# Patient Record
Sex: MALE | Race: WHITE | Employment: OTHER | URBAN - METROPOLITAN AREA
[De-identification: names, ages, dates, MRNs, and addresses within clinical notes are randomized per-mention and may not be internally consistent; named-entity substitution may affect disease eponyms.]

---

## 2019-04-03 ENCOUNTER — OFFICE VISIT (OUTPATIENT)
Dept: GASTROENTEROLOGY | Age: 84
End: 2019-04-03
Payer: MEDICARE

## 2019-04-03 VITALS
SYSTOLIC BLOOD PRESSURE: 122 MMHG | HEART RATE: 56 BPM | TEMPERATURE: 97.8 F | HEIGHT: 73 IN | WEIGHT: 179 LBS | DIASTOLIC BLOOD PRESSURE: 68 MMHG | BODY MASS INDEX: 23.72 KG/M2 | OXYGEN SATURATION: 96 %

## 2019-04-03 DIAGNOSIS — R19.4 CHANGE IN BOWEL HABITS: Primary | ICD-10-CM

## 2019-04-03 DIAGNOSIS — K59.09 OTHER CONSTIPATION: ICD-10-CM

## 2019-04-03 PROCEDURE — 99203 OFFICE O/P NEW LOW 30 MIN: CPT | Performed by: INTERNAL MEDICINE

## 2019-04-03 RX ORDER — POLYETHYLENE GLYCOL 3350 17 G/17G
17 POWDER, FOR SOLUTION ORAL DAILY
Status: ON HOLD | COMMUNITY
End: 2020-03-25

## 2019-04-03 RX ORDER — COVID-19 ANTIGEN TEST
1 KIT MISCELLANEOUS PRN
Status: ON HOLD | COMMUNITY
End: 2020-09-08

## 2019-04-03 NOTE — PROGRESS NOTES
Gastroenterology Clinic Visit    Konstantin Chapman  [id number]  Chief Complaint   Patient presents with    Consultation     HPI: 81 y.o. male presents to the clinic for a second opinion for GI symptoms. He reports having change in his bowel habits since his colonoscopy in January 2019. Patient underwent a colonoscopy for family history of colon cancer. Prior to this patient underwent a colonoscopy in 2016 at [facility]. At colonoscopy the prep was noted to be suboptimal/inadequate, a polyp was removed, sigmoid diverticulosis and significant redundancy of the colon was noted. Patient reports that since the procedure he is having to take 2 capfuls of MiraLAX, Dulcolax and fiber to facilitate a bowel movement. Prior to this patient reports having normal bowel movements without any supplements. Patient denies any change in weight, denies any blood with the stool, denies any abdominal pain    Review of Systems   All other systems reviewed and are negative. History reviewed. No pertinent past medical history. Past Surgical History:   Procedure Laterality Date    COLONOSCOPY  01/23/2019    [facility]     Current Outpatient Medications on File Prior to Visit   Medication Sig Dispense Refill    Naproxen Sodium (ALEVE) 220 MG CAPS Take 1 capsule by mouth as needed for Pain      polyethylene glycol (GLYCOLAX) powder Take 17 g by mouth daily       No current facility-administered medications on file prior to visit.       Family History   Problem Relation Age of Onset    Colon Cancer Mother       Social History     Socioeconomic History    Marital status:      Spouse name: None    Number of children: None    Years of education: None    Highest education level: None   Occupational History    None   Social Needs    Financial resource strain: None    Food insecurity:     Worry: None     Inability: None    Transportation needs:     Medical: None     Non-medical: None   Tobacco Use    Smoking 2019: Inadequate preparation, diverticulosis of the sigmoid colon, redundant colon, 7 mm polyp in the transverse colon    Assessmentand Plan:  81 y.o. male with change in bowel habits since colonoscopy in January 2019 at [facility]. Patient with no alarm symptoms. It appears patient has had a change in the colonic micro-biome/Christa. - Patient reassured  - Probiotics  - Patient advised to continue MiraLAX, fiber and Colace, taper down as needed. There are no diagnoses linked to this encounter. No follow-ups on file. Kat Ozuna MD   Staff Gastroenterologist  [facility]    Please note this report has been partially produced using speech recognition software and may cause contain errors related to thatsystem including grammar, punctuation and spelling as well as words and phrases that may seem inappropriate. If there are questions or concerns please feel free to contact me to clarify.

## 2019-12-20 ENCOUNTER — APPOINTMENT (OUTPATIENT)
Dept: ULTRASOUND IMAGING | Age: 84
End: 2019-12-20
Payer: MEDICARE

## 2019-12-20 ENCOUNTER — HOSPITAL ENCOUNTER (EMERGENCY)
Age: 84
Discharge: HOME | End: 2019-12-20
Payer: MEDICARE

## 2019-12-20 ENCOUNTER — APPOINTMENT (OUTPATIENT)
Dept: GENERAL RADIOLOGY | Age: 84
End: 2019-12-20
Payer: MEDICARE

## 2019-12-20 VITALS
HEART RATE: 66 BPM | BODY MASS INDEX: 23.59 KG/M2 | DIASTOLIC BLOOD PRESSURE: 62 MMHG | OXYGEN SATURATION: 96 % | TEMPERATURE: 97.8 F | HEIGHT: 73 IN | RESPIRATION RATE: 17 BRPM | SYSTOLIC BLOOD PRESSURE: 181 MMHG | WEIGHT: 178 LBS

## 2019-12-20 DIAGNOSIS — S86.911A STRAIN OF RIGHT KNEE, INITIAL ENCOUNTER: ICD-10-CM

## 2019-12-20 DIAGNOSIS — S83.421A SPRAIN OF LATERAL COLLATERAL LIGAMENT OF RIGHT KNEE, INITIAL ENCOUNTER: Primary | ICD-10-CM

## 2019-12-20 LAB
ANION GAP SERPL CALCULATED.3IONS-SCNC: 11 MEQ/L (ref 9–15)
BASOPHILS ABSOLUTE: 0 K/UL (ref 0–0.2)
BASOPHILS RELATIVE PERCENT: 0.4 %
BUN BLDV-MCNC: 24 MG/DL (ref 8–23)
CALCIUM SERPL-MCNC: 9 MG/DL (ref 8.5–9.9)
CHLORIDE BLD-SCNC: 106 MEQ/L (ref 95–107)
CO2: 23 MEQ/L (ref 20–31)
CREAT SERPL-MCNC: 0.74 MG/DL (ref 0.7–1.2)
EOSINOPHILS ABSOLUTE: 0.1 K/UL (ref 0–0.7)
EOSINOPHILS RELATIVE PERCENT: 1.6 %
GFR AFRICAN AMERICAN: >60
GFR NON-AFRICAN AMERICAN: >60
GLUCOSE BLD-MCNC: 94 MG/DL (ref 70–99)
HCT VFR BLD CALC: 41.8 % (ref 42–52)
HEMOGLOBIN: 14.3 G/DL (ref 14–18)
INR BLD: 1
LYMPHOCYTES ABSOLUTE: 1.3 K/UL (ref 1–4.8)
LYMPHOCYTES RELATIVE PERCENT: 19.6 %
MCH RBC QN AUTO: 34 PG (ref 27–31.3)
MCHC RBC AUTO-ENTMCNC: 34.2 % (ref 33–37)
MCV RBC AUTO: 99.2 FL (ref 80–100)
MONOCYTES ABSOLUTE: 0.7 K/UL (ref 0.2–0.8)
MONOCYTES RELATIVE PERCENT: 9.8 %
NEUTROPHILS ABSOLUTE: 4.6 K/UL (ref 1.4–6.5)
NEUTROPHILS RELATIVE PERCENT: 68.6 %
PDW BLD-RTO: 13.3 % (ref 11.5–14.5)
PLATELET # BLD: 164 K/UL (ref 130–400)
POTASSIUM SERPL-SCNC: 4.2 MEQ/L (ref 3.4–4.9)
PROTHROMBIN TIME: 13.3 SEC (ref 12.3–14.9)
RBC # BLD: 4.21 M/UL (ref 4.7–6.1)
SODIUM BLD-SCNC: 140 MEQ/L (ref 135–144)
WBC # BLD: 6.7 K/UL (ref 4.8–10.8)

## 2019-12-20 PROCEDURE — 80048 BASIC METABOLIC PNL TOTAL CA: CPT

## 2019-12-20 PROCEDURE — 73562 X-RAY EXAM OF KNEE 3: CPT

## 2019-12-20 PROCEDURE — 93971 EXTREMITY STUDY: CPT

## 2019-12-20 PROCEDURE — 36415 COLL VENOUS BLD VENIPUNCTURE: CPT

## 2019-12-20 PROCEDURE — 85610 PROTHROMBIN TIME: CPT

## 2019-12-20 PROCEDURE — 85025 COMPLETE CBC W/AUTO DIFF WBC: CPT

## 2019-12-20 PROCEDURE — 99284 EMERGENCY DEPT VISIT MOD MDM: CPT

## 2019-12-20 ASSESSMENT — PAIN DESCRIPTION - PAIN TYPE: TYPE: ACUTE PAIN

## 2019-12-20 ASSESSMENT — PAIN DESCRIPTION - ORIENTATION: ORIENTATION: RIGHT

## 2019-12-20 ASSESSMENT — PAIN DESCRIPTION - FREQUENCY: FREQUENCY: INTERMITTENT

## 2019-12-20 ASSESSMENT — PAIN SCALES - GENERAL: PAINLEVEL_OUTOF10: 3

## 2019-12-20 ASSESSMENT — PAIN DESCRIPTION - LOCATION: LOCATION: KNEE

## 2019-12-21 ASSESSMENT — ENCOUNTER SYMPTOMS
SHORTNESS OF BREATH: 0
NAUSEA: 0
WHEEZING: 0
COLOR CHANGE: 0
ABDOMINAL PAIN: 0
TROUBLE SWALLOWING: 0
CONSTIPATION: 0
BACK PAIN: 0
VOMITING: 0
ABDOMINAL DISTENTION: 0
DIARRHEA: 0
RHINORRHEA: 0
SORE THROAT: 0
CHEST TIGHTNESS: 0
COUGH: 0

## 2020-01-21 ENCOUNTER — HOSPITAL ENCOUNTER (EMERGENCY)
Age: 85
Discharge: ANOTHER HOSPITAL | End: 2020-01-21
Attending: STUDENT IN AN ORGANIZED HEALTH CARE EDUCATION/TRAINING PROGRAM
Payer: MEDICARE

## 2020-01-21 ENCOUNTER — APPOINTMENT (OUTPATIENT)
Dept: GENERAL RADIOLOGY | Age: 85
End: 2020-01-21
Payer: MEDICARE

## 2020-01-21 VITALS
BODY MASS INDEX: 23.06 KG/M2 | WEIGHT: 174 LBS | HEART RATE: 80 BPM | OXYGEN SATURATION: 98 % | HEIGHT: 73 IN | RESPIRATION RATE: 18 BRPM | TEMPERATURE: 98.1 F | SYSTOLIC BLOOD PRESSURE: 180 MMHG | DIASTOLIC BLOOD PRESSURE: 70 MMHG

## 2020-01-21 PROBLEM — R91.1 SOLITARY PULMONARY NODULE: Status: ACTIVE | Noted: 2019-02-06

## 2020-01-21 PROBLEM — E78.5 HLD (HYPERLIPIDEMIA): Status: ACTIVE | Noted: 2020-01-21

## 2020-01-21 PROBLEM — D31.32 CHOROIDAL NEVUS OF LEFT EYE: Status: ACTIVE | Noted: 2018-12-13

## 2020-01-21 PROBLEM — C67.9 PRIMARY MALIGNANT NEOPLASM OF BLADDER (HCC): Status: ACTIVE | Noted: 2018-03-14

## 2020-01-21 PROBLEM — C64.9 MALIGNANT NEOPLASM OF UNSPECIFIED KIDNEY, EXCEPT RENAL PELVIS (HCC): Status: ACTIVE | Noted: 2019-02-06

## 2020-01-21 PROBLEM — R31.0 FRANK HEMATURIA: Status: ACTIVE | Noted: 2020-01-21

## 2020-01-21 PROBLEM — H35.3132 NONEXUDATIVE AGE-RELATED MACULAR DEGENERATION, BILATERAL, INTERMEDIATE DRY STAGE: Status: ACTIVE | Noted: 2017-08-16

## 2020-01-21 PROBLEM — H43.813 POSTERIOR VITREOUS DETACHMENT OF BOTH EYES: Status: ACTIVE | Noted: 2018-12-13

## 2020-01-21 LAB
ABO/RH: NORMAL
ANION GAP SERPL CALCULATED.3IONS-SCNC: 12 MEQ/L (ref 9–15)
ANTIBODY SCREEN: NORMAL
APTT: 31.3 SEC (ref 24.4–36.8)
BASOPHILS ABSOLUTE: 0 K/UL (ref 0–0.2)
BASOPHILS RELATIVE PERCENT: 0.2 %
BUN BLDV-MCNC: 17 MG/DL (ref 8–23)
CALCIUM SERPL-MCNC: 9.5 MG/DL (ref 8.5–9.9)
CHLORIDE BLD-SCNC: 104 MEQ/L (ref 95–107)
CO2: 24 MEQ/L (ref 20–31)
CREAT SERPL-MCNC: 0.76 MG/DL (ref 0.7–1.2)
EOSINOPHILS ABSOLUTE: 0 K/UL (ref 0–0.7)
EOSINOPHILS RELATIVE PERCENT: 0.5 %
GFR AFRICAN AMERICAN: >60
GFR NON-AFRICAN AMERICAN: >60
GLUCOSE BLD-MCNC: 108 MG/DL (ref 70–99)
HCT VFR BLD CALC: 43 % (ref 42–52)
HEMOGLOBIN: 14.7 G/DL (ref 14–18)
INR BLD: 1
LYMPHOCYTES ABSOLUTE: 1 K/UL (ref 1–4.8)
LYMPHOCYTES RELATIVE PERCENT: 13.5 %
MCH RBC QN AUTO: 34.2 PG (ref 27–31.3)
MCHC RBC AUTO-ENTMCNC: 34.1 % (ref 33–37)
MCV RBC AUTO: 100.1 FL (ref 80–100)
MONOCYTES ABSOLUTE: 0.6 K/UL (ref 0.2–0.8)
MONOCYTES RELATIVE PERCENT: 8.2 %
NEUTROPHILS ABSOLUTE: 5.9 K/UL (ref 1.4–6.5)
NEUTROPHILS RELATIVE PERCENT: 77.6 %
PDW BLD-RTO: 13 % (ref 11.5–14.5)
PLATELET # BLD: 157 K/UL (ref 130–400)
POTASSIUM SERPL-SCNC: 4.9 MEQ/L (ref 3.4–4.9)
PROTHROMBIN TIME: 13.8 SEC (ref 12.3–14.9)
RBC # BLD: 4.3 M/UL (ref 4.7–6.1)
SODIUM BLD-SCNC: 140 MEQ/L (ref 135–144)
WBC # BLD: 7.6 K/UL (ref 4.8–10.8)

## 2020-01-21 PROCEDURE — 6360000002 HC RX W HCPCS: Performed by: NURSE PRACTITIONER

## 2020-01-21 PROCEDURE — 85610 PROTHROMBIN TIME: CPT

## 2020-01-21 PROCEDURE — 73130 X-RAY EXAM OF HAND: CPT

## 2020-01-21 PROCEDURE — 86850 RBC ANTIBODY SCREEN: CPT

## 2020-01-21 PROCEDURE — 36415 COLL VENOUS BLD VENIPUNCTURE: CPT

## 2020-01-21 PROCEDURE — 96375 TX/PRO/DX INJ NEW DRUG ADDON: CPT

## 2020-01-21 PROCEDURE — 80048 BASIC METABOLIC PNL TOTAL CA: CPT

## 2020-01-21 PROCEDURE — 99284 EMERGENCY DEPT VISIT MOD MDM: CPT

## 2020-01-21 PROCEDURE — 2580000003 HC RX 258: Performed by: NURSE PRACTITIONER

## 2020-01-21 PROCEDURE — APPSS45 APP SPLIT SHARED TIME 31-45 MINUTES: Performed by: PHYSICIAN ASSISTANT

## 2020-01-21 PROCEDURE — 86900 BLOOD TYPING SEROLOGIC ABO: CPT

## 2020-01-21 PROCEDURE — 85025 COMPLETE CBC W/AUTO DIFF WBC: CPT

## 2020-01-21 PROCEDURE — 86901 BLOOD TYPING SEROLOGIC RH(D): CPT

## 2020-01-21 PROCEDURE — 71045 X-RAY EXAM CHEST 1 VIEW: CPT

## 2020-01-21 PROCEDURE — 85730 THROMBOPLASTIN TIME PARTIAL: CPT

## 2020-01-21 PROCEDURE — 96365 THER/PROPH/DIAG IV INF INIT: CPT

## 2020-01-21 PROCEDURE — 90471 IMMUNIZATION ADMIN: CPT | Performed by: NURSE PRACTITIONER

## 2020-01-21 PROCEDURE — 90715 TDAP VACCINE 7 YRS/> IM: CPT | Performed by: NURSE PRACTITIONER

## 2020-01-21 PROCEDURE — 6830039000 HC L3 TRAUMA ALERT

## 2020-01-21 RX ORDER — ONDANSETRON 2 MG/ML
4 INJECTION INTRAMUSCULAR; INTRAVENOUS EVERY 10 MIN PRN
Status: DISCONTINUED | OUTPATIENT
Start: 2020-01-21 | End: 2020-01-21 | Stop reason: HOSPADM

## 2020-01-21 RX ORDER — FENTANYL CITRATE 50 UG/ML
50 INJECTION, SOLUTION INTRAMUSCULAR; INTRAVENOUS EVERY 30 MIN PRN
Status: DISCONTINUED | OUTPATIENT
Start: 2020-01-21 | End: 2020-01-21 | Stop reason: HOSPADM

## 2020-01-21 RX ADMIN — FENTANYL CITRATE 50 MCG: 50 INJECTION, SOLUTION INTRAMUSCULAR; INTRAVENOUS at 13:18

## 2020-01-21 RX ADMIN — TETANUS TOXOID, REDUCED DIPHTHERIA TOXOID AND ACELLULAR PERTUSSIS VACCINE, ADSORBED 0.5 ML: 5; 2.5; 8; 8; 2.5 SUSPENSION INTRAMUSCULAR at 13:19

## 2020-01-21 RX ADMIN — ONDANSETRON 4 MG: 2 INJECTION INTRAMUSCULAR; INTRAVENOUS at 13:18

## 2020-01-21 RX ADMIN — CEFAZOLIN 1 G: 1 INJECTION, POWDER, FOR SOLUTION INTRAMUSCULAR; INTRAVENOUS at 13:18

## 2020-01-21 ASSESSMENT — ENCOUNTER SYMPTOMS
NAUSEA: 0
ABDOMINAL PAIN: 0
DIARRHEA: 0
RHINORRHEA: 0
COUGH: 0
PHOTOPHOBIA: 0
VOMITING: 0
SHORTNESS OF BREATH: 0
SORE THROAT: 0
BACK PAIN: 0
EYE PAIN: 0

## 2020-01-21 ASSESSMENT — PAIN SCALES - GENERAL
PAINLEVEL_OUTOF10: 2
PAINLEVEL_OUTOF10: 6

## 2020-01-21 NOTE — ED NOTES
New dressing applied. Patient has partial cut wedding ring on. Unable to remove.       Olivier Schofield RN  01/21/20 [id number]

## 2020-01-21 NOTE — H&P
Number of children: None    Years of education: None    Highest education level: None   Occupational History    None   Social Needs    Financial resource strain: None    Food insecurity:     Worry: None     Inability: None    Transportation needs:     Medical: None     Non-medical: None   Tobacco Use    Smoking status: Never Smoker    Smokeless tobacco: Never Used   Substance and Sexual Activity    Alcohol use: Not Currently    Drug use: Never    Sexual activity: None   Lifestyle    Physical activity:     Days per week: None     Minutes per session: None    Stress: None   Relationships    Social connections:     Talks on phone: None     Gets together: None     Attends Yarsanism service: None     Active member of club or organization: None     Attends meetings of clubs or organizations: None     Relationship status: None    Intimate partner violence:     Fear of current or ex partner: None     Emotionally abused: None     Physically abused: None     Forced sexual activity: None   Other Topics Concern    None   Social History Narrative    None       FAMILY HISTORY:  Family History   Problem Relation Age of Onset    Colon Cancer Mother            REVIEW OF SYSTEMS:  Constitutional: Negative for  weight loss  HENT: Negative for congestion, facial swelling and bloody nose  Eyes: Negative for  vision changes  Respiratory: Negative for shortness of breath, difficulty breathing  Cardiovascular: Negative for chest wall pain. Gastrointestinal: Negative for abdominal distention, abdominal pain and vomiting. Genitourinary: Negative for  hematuria  Musculoskeletal: Negative for gait difficulties   Skin: Lacerations of L hand, with amputation of L 2nd digit. Negative for bruising, abrasions  Neurological: Negative for dizziness, weakness and light-headedness. Hematological: Negative for easy bruising/bleeding  Psychiatric/Behavioral: Negative for behavioral problems.        Except as noted above the remainder of the review of systems was reviewed and negative. BASIC LABS:  CBC with Differential:    Lab Results   Component Value Date    WBC 7.6 01/21/2020    RBC 4.30 01/21/2020    HGB 14.7 01/21/2020    HCT 43.0 01/21/2020     01/21/2020    .1 01/21/2020    MCH 34.2 01/21/2020    MCHC 34.1 01/21/2020    RDW 13.0 01/21/2020    LYMPHOPCT 13.5 01/21/2020    MONOPCT 8.2 01/21/2020    BASOPCT 0.2 01/21/2020    MONOSABS 0.6 01/21/2020    LYMPHSABS 1.0 01/21/2020    EOSABS 0.0 01/21/2020    BASOSABS 0.0 01/21/2020     CMP:   Lab Results   Component Value Date     01/21/2020    K 4.9 01/21/2020     01/21/2020    CO2 24 01/21/2020    BUN 17 01/21/2020    CREATININE 0.76 01/21/2020    GLUCOSE 108 (H) 01/21/2020    CALCIUM 9.5 01/21/2020    LABGLOM >60.0 01/21/2020    GFRAA >60.0 01/21/2020     Magnesium: No results found for: MG  Troponin: No results found for: TROPONINI  PT/INR:   Recent Labs     01/21/20  1331   PROTIME 13.8   INR 1.0     APTT:   Recent Labs     01/21/20  1331   APTT 31.3     EtOH: No results found for: ETOH    RADIOLOGY:  XR Left Hand: Results pending    ASSESSMENT:  Chantelle Lowe is a 81 y.o. male with PMHx of prostate and bladder CA. He presents to [facility] [place] [facility] [place] [facility] via EMS s/p radial arm saw injury. (+)amputation of L 2nd phalanx. Trauma workup indicates a complete amputation of L 2nd phalanx just proximal of the PIP. There is also evidence of two deep lacerations with partial amputation of L 3rd phalanx, and single laceration w/ partial amputation of L 4th phalanx. PLAN:  Due to the severity of the injuries to the hand, with need for possible reconstruction/reimplanation of the finger, [facility] was contacted for transfer via [facility]. Pt was given prophylactic abx at this facility, with his Tetanus status was updated. He remained hemodynamically stable. Pain well controlled.        Patient discussed with attending trauma surgeon, Dr. Aga Velazquez,

## 2020-01-21 NOTE — ED PROVIDER NOTES
[address] [facility] ED  EMERGENCY DEPARTMENT ENCOUNTER      Pt Name: Brendon Xiong  MRN: [id number]  [place] 1935  Date of evaluation: 1/21/2020  Provider: SAMINA Henning - JAIME    CHIEF COMPLAINT       Chief Complaint   Patient presents with    Hand Injury     left hand on saw, 3 amputations          HISTORY OF PRESENT ILLNESS   (Location/Symptom, Timing/Onset,Context/Setting, Quality, Duration, Modifying Factors, Severity)  Note limiting factors. Brendon Xiong is a 81 y.o. male who presents to the emergency department with complaints of losing a finger and having 2 more fingers \"barely hanging on\" after losing control of saw. No thinners but on 81 asa daily and occasionally 500 naprosyn. Loss of sensation distal to laceration on 3rd, 4th fingers. Location/Symptom - amputation  Onset - pta  Context/Setting - as above  Quality - traumatic amputation  Duration - immediately pta  Modifying Factors - none  Severity - severe        Nursing Notes were reviewed. REVIEW OF SYSTEMS    (2-9 systems for level 4, 10 or more for level 5)     Review of Systems   Constitutional: Negative for chills, diaphoresis, fatigue and fever. HENT: Negative for congestion, rhinorrhea and sore throat. Eyes: Negative for photophobia and pain. Respiratory: Negative for cough and shortness of breath. Cardiovascular: Negative for chest pain and palpitations. Gastrointestinal: Negative for abdominal pain, diarrhea, nausea and vomiting. Genitourinary: Negative for dysuria and flank pain. Musculoskeletal: Negative for back pain. Skin: Positive for wound. Negative for rash. Neurological: Negative for dizziness, light-headedness and headaches. Psychiatric/Behavioral: Negative. All other systems reviewed and are negative. Except as noted above the remainder of the review of systems was reviewed and negative.        PAST MEDICAL HISTORY     Past Medical History:   Diagnosis Date    Cancer [facility]) prostate and bladder      Past Surgical History:   Procedure Laterality Date    COLONOSCOPY  01/23/2019    [facility]    HERNIA REPAIR      x2      Social History     Socioeconomic History    Marital status:      Spouse name: None    Number of children: None    Years of education: None    Highest education level: None   Occupational History    None   Social Needs    Financial resource strain: None    Food insecurity:     Worry: None     Inability: None    Transportation needs:     Medical: None     Non-medical: None   Tobacco Use    Smoking status: Never Smoker    Smokeless tobacco: Never Used   Substance and Sexual Activity    Alcohol use: Not Currently    Drug use: Never    Sexual activity: None   Lifestyle    Physical activity:     Days per week: None     Minutes per session: None    Stress: None   Relationships    Social connections:     Talks on phone: None     Gets together: None     Attends Taoism service: None     Active member of club or organization: None     Attends meetings of clubs or organizations: None     Relationship status: None    Intimate partner violence:     Fear of current or ex partner: None     Emotionally abused: None     Physically abused: None     Forced sexual activity: None   Other Topics Concern    None   Social History Narrative    None       SCREENINGS    [place] Coma Scale  Eye Opening: Spontaneous  Best Verbal Response: Oriented  Best Motor Response: Obeys commands  [place] Coma Scale Score: 15        PHYSICAL EXAM    (up to 7 for level 4, 8 or more for level 5)     ED Triage Vitals   BP Temp Temp src Pulse Resp SpO2 Height Weight   01/21/20 1303 -- -- 01/21/20 1303 01/21/20 1303 01/21/20 1303 01/21/20 1304 01/21/20 1304   (!) 180/70   80 18 98 % 6' 1\" (1.854 m) 174 lb (78.9 kg)       Physical Exam  Vitals signs and nursing note reviewed. Constitutional:       General: He is not in acute distress. Appearance: Normal appearance. He is well-developed. He is not diaphoretic. HENT:      Head: Normocephalic and atraumatic. Eyes:      General: Lids are normal.      Conjunctiva/sclera: Conjunctivae normal.   Neck:      Musculoskeletal: Normal range of motion and neck supple. Cardiovascular:      Rate and Rhythm: Normal rate and regular rhythm. Pulses: Normal pulses. Heart sounds: Normal heart sounds. Pulmonary:      Effort: Pulmonary effort is normal.      Breath sounds: Normal breath sounds. Abdominal:      General: Bowel sounds are normal.      Palpations: Abdomen is soft. Tenderness: There is no tenderness. Musculoskeletal:      Left hand: He exhibits decreased range of motion, tenderness, bony tenderness and laceration. Decreased sensation noted. Comments: Amputation of 2nd phalanx. Partial amputation of 3rd phalanx with dusky appearance of digit distal to lac. Minimal \"not sure\" sensation distal from lac to 3rd digit. Partial amputation of 4th phalanx. Decreased sensation distal to lac. Lymphadenopathy:      Cervical: No cervical adenopathy. Skin:     General: Skin is warm and dry. Capillary Refill: Capillary refill takes less than 2 seconds. Findings: No rash. Neurological:      Mental Status: He is alert and oriented to person, place, and time. Psychiatric:         Thought Content: Thought content normal.         Judgment: Judgment normal.         RESULTS     EKG: All EKG's are interpreted by the Emergency Department Physician who either signs or Co-signsthis chart in the absence of a cardiologist.        RADIOLOGY:   Non-plain filmimages such as CT, Ultrasound and MRI are read by the radiologist. Plain radiographic images are visualized and preliminarily interpreted by the emergency physician with the below findings:    Reviewed.       Interpretation per the Radiologist below, if available at the time ofthis note:    XR HAND RIGHT (MIN 3 VIEWS)    (Results Pending)   XR CHEST PORTABLE    (Results Pending) XR HAND LEFT (MIN 3 VIEWS)    (Results Pending)         ED BEDSIDE ULTRASOUND:   Performed by ED Physician - none    LABS:  Labs Reviewed   CBC WITH AUTO DIFFERENTIAL - Abnormal; Notable for the following components:       Result Value    RBC 4.30 (*)     .1 (*)     MCH 34.2 (*)     All other components within normal limits   BASIC METABOLIC PANEL   APTT   PROTIME-INR   TYPE AND SCREEN       All other labs were within normal range or not returned as of this dictation. EMERGENCY DEPARTMENT COURSE and DIFFERENTIAL DIAGNOSIS/MDM:   Vitals:    Vitals:    01/21/20 1303 01/21/20 1304 01/21/20 1320   BP: (!) 180/70     Pulse: 80     Resp: 18     Temp:   98.1 °F (36.7 °C)   TempSrc:   Oral   SpO2: 98%     Weight:  174 lb (78.9 kg)    Height:  6' 1\" (1.854 m)             MDM Evaluated on Arrival with Trauma team.  Pt has severed 2nd digit with him. Partial amputation of 3rd, 4th digit noted. Needs hand specialist, trauma wishes us to arrange transfer to [facility]. Dr. Kamila Huntley accepts. CRITICAL CARE TIME   Critical Care: The high probability of sudden, clinically significant deterioration in the patient's condition required the highest level of my preparedness to intervene urgently. The services I provided to this patient were to treat and/or prevent clinically significant deterioration. Services included the following: chart data review, reviewing nursing notes and/or old charts, documentation time, consultant collaboration regarding findings and treatment options, medication orders and management, direct patient care, vital sign assessments and ordering, interpreting and reviewing diagnostic studies/lab tests. Aggregate critical care time includes only time during which I was engaged in work directly related to the patient's care, as described above, whether at the bedside or elsewhere in the Emergency Department. It did not include time spent performing other reported procedures.      Critical Care: 47 minutes. CONSULTS:  None    PROCEDURES:  Unless otherwise noted below, none     Procedures    FINAL IMPRESSION      1. Traumatic amputation of finger, initial encounter    2. Partial traumatic amputation of finger through phalanx, initial encounter          DISPOSITION/PLAN   DISPOSITION Decision To Transfer 01/21/2020 01:28:29 PM      PATIENT REFERRED TO:  No follow-up provider specified.     DISCHARGE MEDICATIONS:  New Prescriptions    No medications on file          (Please notethat portions of this note were completed with a voice recognition program.  Efforts were made to edit the dictations but occasionally words are mis-transcribed.)    SAMINA Clay CNP (electronically signed)  Attending Emergency Physician          SAMINA Clay CNP  01/21/20 [address] , APRN - CNP  01/21/20 1750

## 2020-01-21 NOTE — ED NOTES
Patient has full amputation left index finger, partial middle finger amputation proximal to DIP, left ring partial amputation with bone exposure. Xray completed at bedside. Sensation to affected fingers except index finger.       Criselda Rivas RN  01/21/20 1102 [address] Marycarmen Mendoza RN  01/21/20 1318

## 2020-02-07 LAB
FOLATE: 15.5 NG/ML (ref 7.3–26.1)
HBA1C MFR BLD: 5.7 % (ref 4.8–5.9)
TSH SERPL DL<=0.05 MIU/L-ACNC: 3.17 UIU/ML (ref 0.44–3.86)
VITAMIN B-12: 312 PG/ML (ref 232–1245)
VITAMIN D 25-HYDROXY: 25.1 NG/ML (ref 30–100)

## 2020-02-11 LAB
ALBUMIN SERPL-MCNC: 4.29 G/DL (ref 3.75–5.01)
ALPHA-1-GLOBULIN: 0.32 G/DL (ref 0.19–0.46)
ALPHA-2-GLOBULIN: 0.82 G/DL (ref 0.48–1.05)
BETA GLOBULIN: 0.91 G/DL (ref 0.48–1.1)
GAMMA GLOBULIN: 0.95 G/DL (ref 0.62–1.51)
PROTEIN ELECTROPHORESIS, SERUM: NORMAL
SPE/IFE INTERPRETATION: NORMAL
TOTAL PROTEIN: 7.3 G/DL (ref 6.3–8.2)

## 2020-02-19 ENCOUNTER — HOSPITAL ENCOUNTER (OUTPATIENT)
Dept: MRI IMAGING | Age: 85
Discharge: HOME | End: 2020-02-21
Payer: MEDICARE

## 2020-02-19 ENCOUNTER — HOSPITAL ENCOUNTER (OUTPATIENT)
Dept: NEUROLOGY | Age: 85
Discharge: HOME | End: 2020-02-19
Payer: MEDICARE

## 2020-02-19 PROCEDURE — 95816 EEG AWAKE AND DROWSY: CPT

## 2020-02-19 PROCEDURE — 70551 MRI BRAIN STEM W/O DYE: CPT

## 2020-02-19 NOTE — PROCEDURES
[address]                      [address], [address] [facility]                          ELECTROENCEPHALOGRAM REPORT    PATIENT NAME: Elba Martin                       :        1935  MED REC NO:   [id number]                            ROOM:  ACCOUNT NO:   [de-identified]                           ADMIT DATE: 2020  PROVIDER:     Jagjit Colbert MD    DATE OF EE2020    REASON FOR STUDY:  The patient has a history of  seizures. EEG FINDINGS:  This is a 20-channel EEG. The patient has a background  of 9 to 10 Hz alpha activity, which is moderately well-developed and  moderately well-organized. Cardiac monitor shows heart rate of 51 beats  per minute and is regular. Artifacts appear due to patient's movements,  muscle activity and electrodes. Eye opening suppresses the background  mildly. On hyperventilation, artifacts appear. No epileptic discharges  were seen during or after the hyperventilation. On photic stimulation,  poor photic response is seen. No epileptic discharges were seen during  or after the photic stimulation. During sleep, slowing of the  background activity appears. Intermittent slow wave activity appears. IMPRESSION:  This is a mildly abnormal awake, drowsy and sleep EEG due  to the intermittent slow wave frequencies seen. This shall be  consistent with generalized disorder such as metabolic, toxic,  degenerative, ischemic, etc.  No epileptic discharges were seen during  the record. If clinically indicated, we could repeat the study in two  to three months to see if any further changes develop.         Rafaela Pantoja MD    D: 2020 14:49:12       T: 2020 15:39:32     [id number]  Job#: [id number]     Doc#: [id number]    CC:

## 2020-02-28 ENCOUNTER — HOSPITAL ENCOUNTER (OUTPATIENT)
Dept: NEUROLOGY | Age: 85
Discharge: HOME | End: 2020-02-28
Payer: MEDICARE

## 2020-02-28 ENCOUNTER — HOSPITAL ENCOUNTER (OUTPATIENT)
Dept: ULTRASOUND IMAGING | Age: 85
Discharge: HOME | End: 2020-03-01
Payer: MEDICARE

## 2020-02-28 PROCEDURE — 95886 MUSC TEST DONE W/N TEST COMP: CPT

## 2020-02-28 PROCEDURE — 95910 NRV CNDJ TEST 7-8 STUDIES: CPT

## 2020-02-28 PROCEDURE — 93880 EXTRACRANIAL BILAT STUDY: CPT

## 2020-02-28 NOTE — PROCEDURES
[address]                      [address], [address] [facility]                             ELECTROMYOGRAM REPORT    PATIENT NAME: Geraldo Horn                       :        1935  MED REC NO:   [id number]                            ROOM:  ACCOUNT NO:   [de-identified]                           ADMIT DATE: 2020  PROVIDER:     Leonor Patricio MD    DATE OF EM2020    REFERRING PROVIDER:  Leonor Patricio MD.    REASON FOR THE STUDY:  The patient was having numbness in the hands,  especially on the right side. FINDINGS:  Motor nerve conduction velocities are slowed in all the  nerves tested, being worse in the right median nerve. F-wave latencies could not be measured in the right median nerve and  delayed in other nerves tested. Distal motor latencies are mildly delayed in the ulnar nerves, but  significantly delayed in the median nerves, being worse on the right  side. Distal sensory latencies are normal in the left ulnar nerve and mildly  delayed in the right ulnar nerve and significantly delayed in the median  nerves. On the concentric needle electrode examination, denervation changes are  present in the small muscles of the hands bilaterally, being worse in  the thenar muscles. CLINICAL INTERPRETATION:  EMG studies are showing changes of bilateral  median nerve compression neuropathy at the wrists consistent with  diagnosis of bilateral carpal tunnel syndrome, being worse on the right  side. The patient has changes of peripheral neuropathy. Causes of peripheral  neuropathy need to be watched out such as diabetes mellitus,  hypothyroidism, exposure to toxins, autoimmune disorder, etc.    The patient shall need decompression of the median nerves due to  continued symptoms. The patient shall let me know when he is ready.         Carine Henderson MD    D: 2020 14:36:14       T: 2020 18:31:59     [id number]  Job#: [id number]

## 2020-03-24 ENCOUNTER — HOSPITAL ENCOUNTER (EMERGENCY)
Age: 85
Discharge: HOME | End: 2020-03-24
Attending: EMERGENCY MEDICINE
Payer: MEDICARE

## 2020-03-24 ENCOUNTER — APPOINTMENT (OUTPATIENT)
Dept: GENERAL RADIOLOGY | Age: 85
End: 2020-03-24
Payer: MEDICARE

## 2020-03-24 ENCOUNTER — APPOINTMENT (OUTPATIENT)
Dept: CT IMAGING | Age: 85
End: 2020-03-24
Payer: MEDICARE

## 2020-03-24 VITALS
DIASTOLIC BLOOD PRESSURE: 71 MMHG | HEART RATE: 61 BPM | BODY MASS INDEX: 22.26 KG/M2 | TEMPERATURE: 97.5 F | RESPIRATION RATE: 16 BRPM | OXYGEN SATURATION: 100 % | WEIGHT: 168 LBS | SYSTOLIC BLOOD PRESSURE: 151 MMHG | HEIGHT: 73 IN

## 2020-03-24 LAB
ALBUMIN SERPL-MCNC: 4.4 G/DL (ref 3.5–4.6)
ALP BLD-CCNC: 588 U/L (ref 35–104)
ALT SERPL-CCNC: 506 U/L (ref 0–41)
AMYLASE: 149 U/L (ref 22–93)
ANION GAP SERPL CALCULATED.3IONS-SCNC: 16 MEQ/L (ref 9–15)
AST SERPL-CCNC: 291 U/L (ref 0–40)
BASOPHILS ABSOLUTE: 0 K/UL (ref 0–0.2)
BASOPHILS RELATIVE PERCENT: 0.2 %
BILIRUB SERPL-MCNC: 15 MG/DL (ref 0.2–0.7)
BILIRUBIN URINE: NORMAL
BLOOD, URINE: NEGATIVE
BUN BLDV-MCNC: 18 MG/DL (ref 8–23)
CALCIUM SERPL-MCNC: 9.8 MG/DL (ref 8.5–9.9)
CHLORIDE BLD-SCNC: 101 MEQ/L (ref 95–107)
CLARITY: CLEAR
CO2: 25 MEQ/L (ref 20–31)
COLOR: NORMAL
CREAT SERPL-MCNC: 0.43 MG/DL (ref 0.7–1.2)
EKG ATRIAL RATE: 54 BPM
EKG P AXIS: 66 DEGREES
EKG P-R INTERVAL: 200 MS
EKG Q-T INTERVAL: 474 MS
EKG QRS DURATION: 96 MS
EKG QTC CALCULATION (BAZETT): 449 MS
EKG R AXIS: 10 DEGREES
EKG T AXIS: 35 DEGREES
EKG VENTRICULAR RATE: 54 BPM
EOSINOPHILS ABSOLUTE: 0.1 K/UL (ref 0–0.7)
EOSINOPHILS RELATIVE PERCENT: 1.5 %
ETHANOL PERCENT: NORMAL G/DL
ETHANOL: <10 MG/DL (ref 0–0.08)
GFR AFRICAN AMERICAN: >60
GFR NON-AFRICAN AMERICAN: >60
GLOBULIN: 2.9 G/DL (ref 2.3–3.5)
GLUCOSE BLD-MCNC: 112 MG/DL (ref 70–99)
GLUCOSE URINE: NEGATIVE MG/DL
HCT VFR BLD CALC: 37.7 % (ref 42–52)
HEMOGLOBIN: 12.7 G/DL (ref 14–18)
KETONES, URINE: NEGATIVE MG/DL
LACTIC ACID: 1.6 MMOL/L (ref 0.5–2.2)
LEUKOCYTE ESTERASE, URINE: NEGATIVE
LIPASE: 328 U/L (ref 12–95)
LYMPHOCYTES ABSOLUTE: 2.5 K/UL (ref 1–4.8)
LYMPHOCYTES RELATIVE PERCENT: 40.7 %
MCH RBC QN AUTO: 33.5 PG (ref 27–31.3)
MCHC RBC AUTO-ENTMCNC: 33.8 % (ref 33–37)
MCV RBC AUTO: 99.2 FL (ref 80–100)
MONOCYTES ABSOLUTE: 0.6 K/UL (ref 0.2–0.8)
MONOCYTES RELATIVE PERCENT: 9.6 %
NEUTROPHILS ABSOLUTE: 2.9 K/UL (ref 1.4–6.5)
NEUTROPHILS RELATIVE PERCENT: 48 %
NITRITE, URINE: NEGATIVE
PDW BLD-RTO: 14.3 % (ref 11.5–14.5)
PH UA: 6 (ref 5–9)
PLATELET # BLD: 187 K/UL (ref 130–400)
POTASSIUM SERPL-SCNC: 3.6 MEQ/L (ref 3.4–4.9)
PROTEIN UA: NEGATIVE MG/DL
RBC # BLD: 3.8 M/UL (ref 4.7–6.1)
SODIUM BLD-SCNC: 142 MEQ/L (ref 135–144)
SPECIFIC GRAVITY UA: 1.01 (ref 1–1.03)
TOTAL PROTEIN: 7.3 G/DL (ref 6.3–8)
TROPONIN: <0.01 NG/ML (ref 0–0.01)
URINE REFLEX TO CULTURE: NORMAL
UROBILINOGEN, URINE: 0.2 E.U./DL
WBC # BLD: 6.1 K/UL (ref 4.8–10.8)

## 2020-03-24 PROCEDURE — 93005 ELECTROCARDIOGRAM TRACING: CPT

## 2020-03-24 PROCEDURE — 83690 ASSAY OF LIPASE: CPT

## 2020-03-24 PROCEDURE — 82150 ASSAY OF AMYLASE: CPT

## 2020-03-24 PROCEDURE — 80053 COMPREHEN METABOLIC PANEL: CPT

## 2020-03-24 PROCEDURE — 87040 BLOOD CULTURE FOR BACTERIA: CPT

## 2020-03-24 PROCEDURE — 2580000003 HC RX 258: Performed by: EMERGENCY MEDICINE

## 2020-03-24 PROCEDURE — 84484 ASSAY OF TROPONIN QUANT: CPT

## 2020-03-24 PROCEDURE — 74177 CT ABD & PELVIS W/CONTRAST: CPT

## 2020-03-24 PROCEDURE — 83605 ASSAY OF LACTIC ACID: CPT

## 2020-03-24 PROCEDURE — 81003 URINALYSIS AUTO W/O SCOPE: CPT

## 2020-03-24 PROCEDURE — G0480 DRUG TEST DEF 1-7 CLASSES: HCPCS

## 2020-03-24 PROCEDURE — 99285 EMERGENCY DEPT VISIT HI MDM: CPT

## 2020-03-24 PROCEDURE — 85025 COMPLETE CBC W/AUTO DIFF WBC: CPT

## 2020-03-24 PROCEDURE — 36415 COLL VENOUS BLD VENIPUNCTURE: CPT

## 2020-03-24 PROCEDURE — 71045 X-RAY EXAM CHEST 1 VIEW: CPT

## 2020-03-24 PROCEDURE — 6360000004 HC RX CONTRAST MEDICATION: Performed by: EMERGENCY MEDICINE

## 2020-03-24 RX ORDER — OXYCODONE HYDROCHLORIDE AND ACETAMINOPHEN 5; 325 MG/1; MG/1
1-2 TABLET ORAL EVERY 6 HOURS PRN
Qty: 20 TABLET | Refills: 0 | Status: ON HOLD | OUTPATIENT
Start: 2020-03-24 | End: 2020-03-25

## 2020-03-24 RX ORDER — PROMETHAZINE HYDROCHLORIDE 25 MG/1
25 SUPPOSITORY RECTAL EVERY 6 HOURS PRN
Qty: 20 SUPPOSITORY | Refills: 0 | Status: ON HOLD | OUTPATIENT
Start: 2020-03-24 | End: 2020-03-25

## 2020-03-24 RX ORDER — 0.9 % SODIUM CHLORIDE 0.9 %
500 INTRAVENOUS SOLUTION INTRAVENOUS ONCE
Status: COMPLETED | OUTPATIENT
Start: 2020-03-24 | End: 2020-03-24

## 2020-03-24 RX ORDER — ONDANSETRON 4 MG/1
4 TABLET, FILM COATED ORAL EVERY 8 HOURS PRN
Qty: 20 TABLET | Refills: 0 | Status: ON HOLD | OUTPATIENT
Start: 2020-03-24 | End: 2020-03-25

## 2020-03-24 RX ORDER — PANTOPRAZOLE SODIUM 40 MG/1
60 TABLET, DELAYED RELEASE ORAL DAILY
COMMUNITY
Start: 2020-03-13

## 2020-03-24 RX ORDER — SODIUM CHLORIDE 0.9 % (FLUSH) 0.9 %
3 SYRINGE (ML) INJECTION EVERY 8 HOURS
Status: DISCONTINUED | OUTPATIENT
Start: 2020-03-24 | End: 2020-03-25 | Stop reason: HOSPADM

## 2020-03-24 RX ADMIN — IOPAMIDOL 100 ML: 755 INJECTION, SOLUTION INTRAVENOUS at 20:38

## 2020-03-24 RX ADMIN — SODIUM CHLORIDE 500 ML: 9 INJECTION, SOLUTION INTRAVENOUS at 19:56

## 2020-03-25 ENCOUNTER — APPOINTMENT (OUTPATIENT)
Dept: CT IMAGING | Age: 85
DRG: 435 | End: 2020-03-25
Attending: INTERNAL MEDICINE
Payer: MEDICARE

## 2020-03-25 ENCOUNTER — APPOINTMENT (OUTPATIENT)
Dept: MRI IMAGING | Age: 85
DRG: 435 | End: 2020-03-25
Attending: INTERNAL MEDICINE
Payer: MEDICARE

## 2020-03-25 ENCOUNTER — HOSPITAL ENCOUNTER (INPATIENT)
Age: 85
LOS: 2 days | Discharge: HOME | DRG: 435 | End: 2020-03-27
Attending: INTERNAL MEDICINE | Admitting: INTERNAL MEDICINE
Payer: MEDICARE

## 2020-03-25 ENCOUNTER — CARE COORDINATION (OUTPATIENT)
Dept: CARE COORDINATION | Age: 85
End: 2020-03-25

## 2020-03-25 PROBLEM — C80.1 OBSTRUCTIVE JAUNDICE DUE TO MALIGNANT NEOPLASM (HCC): Status: ACTIVE | Noted: 2020-03-25

## 2020-03-25 PROBLEM — K83.1 OBSTRUCTIVE JAUNDICE DUE TO MALIGNANT NEOPLASM (HCC): Status: ACTIVE | Noted: 2020-03-25

## 2020-03-25 LAB
ALBUMIN SERPL-MCNC: 3.8 G/DL (ref 3.5–4.6)
ALP BLD-CCNC: 532 U/L (ref 35–104)
ALT SERPL-CCNC: 415 U/L (ref 0–41)
ANION GAP SERPL CALCULATED.3IONS-SCNC: 17 MEQ/L (ref 9–15)
AST SERPL-CCNC: 234 U/L (ref 0–40)
BASOPHILS ABSOLUTE: 0 K/UL (ref 0–0.2)
BASOPHILS RELATIVE PERCENT: 0.3 %
BILIRUB SERPL-MCNC: 14.1 MG/DL (ref 0.2–0.7)
BUN BLDV-MCNC: 16 MG/DL (ref 8–23)
CALCIUM SERPL-MCNC: 9.3 MG/DL (ref 8.5–9.9)
CHLORIDE BLD-SCNC: 103 MEQ/L (ref 95–107)
CO2: 21 MEQ/L (ref 20–31)
CREAT SERPL-MCNC: 0.45 MG/DL (ref 0.7–1.2)
EOSINOPHILS ABSOLUTE: 0 K/UL (ref 0–0.7)
EOSINOPHILS RELATIVE PERCENT: 0.6 %
GFR AFRICAN AMERICAN: >60
GFR NON-AFRICAN AMERICAN: >60
GLOBULIN: 2.7 G/DL (ref 2.3–3.5)
GLUCOSE BLD-MCNC: 112 MG/DL (ref 70–99)
HCT VFR BLD CALC: 35.2 % (ref 42–52)
HEMOGLOBIN: 12.1 G/DL (ref 14–18)
INR BLD: 1.2
LYMPHOCYTES ABSOLUTE: 0.8 K/UL (ref 1–4.8)
LYMPHOCYTES RELATIVE PERCENT: 13.1 %
MCH RBC QN AUTO: 34.1 PG (ref 27–31.3)
MCHC RBC AUTO-ENTMCNC: 34.4 % (ref 33–37)
MCV RBC AUTO: 99 FL (ref 80–100)
MONOCYTES ABSOLUTE: 0.5 K/UL (ref 0.2–0.8)
MONOCYTES RELATIVE PERCENT: 7.6 %
NEUTROPHILS ABSOLUTE: 4.9 K/UL (ref 1.4–6.5)
NEUTROPHILS RELATIVE PERCENT: 78.4 %
PDW BLD-RTO: 13.8 % (ref 11.5–14.5)
PLATELET # BLD: 167 K/UL (ref 130–400)
POTASSIUM REFLEX MAGNESIUM: 3.7 MEQ/L (ref 3.4–4.9)
PROTHROMBIN TIME: 15.8 SEC (ref 12.3–14.9)
RBC # BLD: 3.56 M/UL (ref 4.7–6.1)
SODIUM BLD-SCNC: 141 MEQ/L (ref 135–144)
TOTAL PROTEIN: 6.5 G/DL (ref 6.3–8)
WBC # BLD: 6.3 K/UL (ref 4.8–10.8)

## 2020-03-25 PROCEDURE — 85610 PROTHROMBIN TIME: CPT

## 2020-03-25 PROCEDURE — 80053 COMPREHEN METABOLIC PANEL: CPT

## 2020-03-25 PROCEDURE — 85025 COMPLETE CBC W/AUTO DIFF WBC: CPT

## 2020-03-25 PROCEDURE — 1210000000 HC MED SURG R&B

## 2020-03-25 PROCEDURE — 36415 COLL VENOUS BLD VENIPUNCTURE: CPT

## 2020-03-25 PROCEDURE — 74183 MRI ABD W/O CNTR FLWD CNTR: CPT

## 2020-03-25 PROCEDURE — 99223 1ST HOSP IP/OBS HIGH 75: CPT | Performed by: INTERNAL MEDICINE

## 2020-03-25 PROCEDURE — 6360000004 HC RX CONTRAST MEDICATION: Performed by: INTERNAL MEDICINE

## 2020-03-25 PROCEDURE — 71260 CT THORAX DX C+: CPT

## 2020-03-25 PROCEDURE — A9577 INJ MULTIHANCE: HCPCS | Performed by: INTERNAL MEDICINE

## 2020-03-25 PROCEDURE — 2580000003 HC RX 258: Performed by: INTERNAL MEDICINE

## 2020-03-25 RX ORDER — ERGOCALCIFEROL (VITAMIN D2) 50 MCG
1 CAPSULE ORAL DAILY
Status: ON HOLD | COMMUNITY
End: 2020-09-09 | Stop reason: HOSPADM

## 2020-03-25 RX ORDER — MAGNESIUM SULFATE IN WATER 40 MG/ML
2 INJECTION, SOLUTION INTRAVENOUS PRN
Status: DISCONTINUED | OUTPATIENT
Start: 2020-03-25 | End: 2020-03-27 | Stop reason: HOSPADM

## 2020-03-25 RX ORDER — SODIUM CHLORIDE 9 MG/ML
INJECTION, SOLUTION INTRAVENOUS CONTINUOUS
Status: DISCONTINUED | OUTPATIENT
Start: 2020-03-25 | End: 2020-03-27 | Stop reason: HOSPADM

## 2020-03-25 RX ORDER — SODIUM CHLORIDE 0.9 % (FLUSH) 0.9 %
10 SYRINGE (ML) INJECTION PRN
Status: DISCONTINUED | OUTPATIENT
Start: 2020-03-25 | End: 2020-03-27 | Stop reason: HOSPADM

## 2020-03-25 RX ORDER — SODIUM CHLORIDE 0.9 % (FLUSH) 0.9 %
10 SYRINGE (ML) INJECTION EVERY 12 HOURS SCHEDULED
Status: DISCONTINUED | OUTPATIENT
Start: 2020-03-25 | End: 2020-03-27 | Stop reason: HOSPADM

## 2020-03-25 RX ORDER — POLYETHYLENE GLYCOL 3350 17 G/17G
17 POWDER, FOR SOLUTION ORAL DAILY PRN
Status: DISCONTINUED | OUTPATIENT
Start: 2020-03-25 | End: 2020-03-27 | Stop reason: HOSPADM

## 2020-03-25 RX ORDER — ONDANSETRON 2 MG/ML
4 INJECTION INTRAMUSCULAR; INTRAVENOUS EVERY 6 HOURS PRN
Status: DISCONTINUED | OUTPATIENT
Start: 2020-03-25 | End: 2020-03-27 | Stop reason: HOSPADM

## 2020-03-25 RX ORDER — POTASSIUM CHLORIDE 20 MEQ/1
40 TABLET, EXTENDED RELEASE ORAL PRN
Status: DISCONTINUED | OUTPATIENT
Start: 2020-03-25 | End: 2020-03-27 | Stop reason: HOSPADM

## 2020-03-25 RX ORDER — POTASSIUM CHLORIDE 7.45 MG/ML
10 INJECTION INTRAVENOUS PRN
Status: DISCONTINUED | OUTPATIENT
Start: 2020-03-25 | End: 2020-03-27 | Stop reason: HOSPADM

## 2020-03-25 RX ORDER — PROMETHAZINE HYDROCHLORIDE 12.5 MG/1
12.5 TABLET ORAL EVERY 6 HOURS PRN
Status: DISCONTINUED | OUTPATIENT
Start: 2020-03-25 | End: 2020-03-27 | Stop reason: HOSPADM

## 2020-03-25 RX ADMIN — SODIUM CHLORIDE: 9 INJECTION, SOLUTION INTRAVENOUS at 20:44

## 2020-03-25 RX ADMIN — GADOBENATE DIMEGLUMINE 20 ML: 529 INJECTION, SOLUTION INTRAVENOUS at 19:53

## 2020-03-25 RX ADMIN — IOPAMIDOL 75 ML: 755 INJECTION, SOLUTION INTRAVENOUS at 14:22

## 2020-03-25 NOTE — ED PROVIDER NOTES
[facility] ED  eMERGENCY dEPARTMENT eNCOUnter      Pt Name: Phillip Cabral  MRN: [id number]  [place] 1935  Date of evaluation: 3/24/2020  Provider: Iggy Rider MD    CHIEF COMPLAINT       Chief Complaint   Patient presents with    Jaundice     pt reports that his wife noticed that he appeared yellow. pt presents with severe jaundice, diffuse ABD discomfort, no hx of GI issues. pt reports lighter colored stools (started approx 1 month ago), and darker urine. HISTORY OF PRESENT ILLNESS   (Location/Symptom, Timing/Onset,Context/Setting, Quality, Duration, Modifying Factors, Severity)  Note limiting factors. Phillip Cabral is a 81 y.o. male who presents to the emergency department as lighter colored stools, for approximately a month and then 2 weeks of poor appetite and low energy levels. He noticed in the past 3 to 4 days that his color is becoming yellowish and his wife noticed this as well. There is no abdominal pain. He has had prostate surgery but no other surgeries. He is never had gallbladder surgery or gallbladder trouble to his knowledge, no history of diverticulitis. Fever is not present. Cough is not present. Chest pain is not present. Urine has been darker in color    HPI    NursingNotes were reviewed. REVIEW OF SYSTEMS    (2-9 systems for level 4, 10 or more for level 5)     Review of Systems     Constitutional symptoms: Positive fatigue, no fever, no chills. Skin symptoms:  Negative except as documented in HPI. Yellow color  ENMT symptoms:  Negative except as documented in HPI. Respiratory symptoms:  Negative except as documented in HPI. Cardiovascular symptoms:  Negative except as documented in HPI. Gastrointestinal symptoms: Negative except for documented as above in the HPI, poor appetite   Genitourinary symptoms:  Negative except as documented in HPI. Musculoskeletal symptoms:  Negative except as documented in HPI.    Neurologic symptoms:  Negative except as documented in HPI. Remainder of 10 systems, all negative except for mentioned above      Except as noted above the remainder of the review of systems was reviewed and negative.        PAST MEDICAL HISTORY     Past Medical History:   Diagnosis Date    Cancer [facility])     prostate and bladder          SURGICALHISTORY       Past Surgical History:   Procedure Laterality Date    COLONOSCOPY  01/23/2019    [facility]    HERNIA REPAIR      x2          CURRENT MEDICATIONS       Previous Medications    ASPIRIN 81 MG TABLET    Take 81 mg by mouth daily    NAPROXEN SODIUM (ALEVE) 220 MG CAPS    Take 1 capsule by mouth as needed for Pain    PANTOPRAZOLE (PROTONIX) 40 MG TABLET    Take 40 mg by mouth daily    POLYETHYLENE GLYCOL (GLYCOLAX) POWDER    Take 17 g by mouth daily       ALLERGIES     Amben [aminobenzoate]    FAMILY HISTORY       Family History   Problem Relation Age of Onset    Colon Cancer Mother           SOCIAL HISTORY       Social History     Socioeconomic History    Marital status:      Spouse name: None    Number of children: None    Years of education: None    Highest education level: None   Occupational History    None   Social Needs    Financial resource strain: None    Food insecurity     Worry: None     Inability: None    Transportation needs     Medical: None     Non-medical: None   Tobacco Use    Smoking status: Never Smoker    Smokeless tobacco: Never Used   Substance and Sexual Activity    Alcohol use: Not Currently    Drug use: Never    Sexual activity: None   Lifestyle    Physical activity     Days per week: None     Minutes per session: None    Stress: None   Relationships    Social connections     Talks on phone: None     Gets together: None     Attends Orthodox service: None     Active member of club or organization: None     Attends meetings of clubs or organizations: None     Relationship status: None    Intimate partner violence     Fear of current or ex partner: None Emotionally abused: None     Physically abused: None     Forced sexual activity: None   Other Topics Concern    None   Social History Narrative    None       SCREENINGS      @FLOW([id number])@      PHYSICAL EXAM    (up to 7 for level 4, 8 or more for level 5)     ED Triage Vitals [03/24/20 1937]   BP Temp Temp Source Pulse Resp SpO2 Height Weight   (!) 203/81 97.5 °F (36.4 °C) Temporal 57 18 100 % 6' 1\" (1.854 m) 168 lb (76.2 kg)       Physical Exam     CONST: -Well-developed well-nourished ;                -In no acute distress. -Vitals reviewed. Pleasant, obviously jaundiced  EYES: -EOM intact, LUKE:              -Sclera normal and conjunctiva: clear bilaterally. Scleral icterus is present  ENT: - Normal pharynx pink and moist.    NECK: -Supple (chin-to-chest). CARD: -Rate and rhythm: Regular              -Murmurs: No  RESP: -Respiratory effort and chest excursion with respirations: Normal             -Breath sounds equal bilaterally: Clear             -Wheezes: No             -Rales: No    BACK: -Flank pain: No              -Pain on palpation: No    ABD: -Distended: No           -Bruits: No           -Bowel sounds: Normal.           -Deep palpation: Non-tender           -Organomegaly palpable: No           -Abnormal masses: No    EXT: Gross appearance and use of all four extremities: Normal     SKIN: -Good turgor warm and dry. -Apparent lesions or rashes: No    NEURO: -Patient: alert                -Oriented to: person, place and time. -Appearance and judgment: appropriate.                -Cranial Nerves: Normal.                -Speech: Normal          DIAGNOSTIC RESULTS     EKG: All EKG's are interpreted by the Emergency Department Physician who either signs or Co-signsthis chart in the absence of a cardiologist.    EKG was revewed  and revealed normal sinus rhythm, rate is 70-85. no acute ST elevations,no flipped T waves , no Q waves.   KS interval is normal.QRS following components:    Amylase 149 (*)     All other components within normal limits   CULTURE, BLOOD 1   CULTURE, BLOOD 2   LACTIC ACID, PLASMA   URINE RT REFLEX TO CULTURE   TROPONIN   ETHANOL       All other labs were within normal range or not returned as of this dictation. EMERGENCY DEPARTMENT COURSE and DIFFERENTIAL DIAGNOSIS/MDM:   Vitals:    Vitals:    03/24/20 1937 03/24/20 2029   BP: (!) 203/81 (!) 151/71   Pulse: 57 61   Resp: 18 16   Temp: 97.5 °F (36.4 °C)    TempSrc: Temporal    SpO2: 100% 100%   Weight: 168 lb (76.2 kg)    Height: 6' 1\" (1.854 m)            MDM     CT scan, blood work very evident for obstructive pancreatic cancer. He is aware of this. Admission was advised and declined. He wishes to go home and talk to his doctor about this, get an oncologist.  If he cannot see an oncologist in very short order, namely 3 days, he is to consider very strongly being admitted. He will return or go to [facility] if he fails to get expeditious care. We explained the treatment for this. We explained the fact that treatment, decline would be progressive and death very certain. Nursing staff was in attendance during this discussion. Discussion regarding the pancreatic cancer, prognosis, treatment, took approximately 10 minutes. CRITICAL CARE TIME   Total Critical Care time was  minutes, excluding separately reportableprocedures. There was a high probability of clinicallysignificant/life threatening deterioration in the patient's condition which required my urgent intervention. CONSULTS:  None    PROCEDURES:  Unless otherwise noted below, none     Procedures    FINAL IMPRESSION      1. Malignant neoplasm of head of pancreas ([facility] [address]    2.  Obstructive jaundice          DISPOSITION/PLAN   DISPOSITION Decision To Discharge 03/24/2020 10:06:38 PM      PATIENT REFERRED TO:  Shira Wesley MD  [address] [id number] [phone]    In 1 day  Return if you change

## 2020-03-25 NOTE — H&P
Hospital Medicine  History and Physical    Patient:  Bethel Dinero  MRN: [id number]    CHIEF COMPLAINT:  No chief complaint on file. History Obtained From:  Patient, EMR  Primary Care Physician: Munir Navas MD    HISTORY OF PRESENT ILLNESS:   The patient is a 81 y.o. male with PMHx of BPH, HLD, bladder cancer  who presents with jaundice. Non smoker, non drinker. Patient states a week and a half ago he developed dark urine and pale stools. He then noticed yellow eyes and skin so he called his [facility] doctor to tell them and they told him to go tot he ER. Patient denies fevers, chills, sweats, diarrhea. Does have skin itching. Past Medical History:      Diagnosis Date    Cancer [facility])     prostate and bladder      Past Surgical History:      Procedure Laterality Date    COLONOSCOPY  01/23/2019    ccf    HERNIA REPAIR      x2      Medications Prior to Admission:    Prior to Admission medications    Medication Sig Start Date End Date Taking? Authorizing Provider   Vitamin D, Ergocalciferol, 50 MCG (2000 UT) CAPS Take 1 tablet by mouth daily   Yes Historical Provider, MD   pantoprazole (PROTONIX) 40 MG tablet Take 40 mg by mouth daily 3/13/20 4/12/20 Yes Historical Provider, MD   Naproxen Sodium (ALEVE) 220 MG CAPS Take 1 capsule by mouth as needed for Pain    Historical Provider, MD     Allergies:  Amben [aminobenzoate]    Social History:   TOBACCO:   reports that he has never smoked. He has never used smokeless tobacco.  ETOH:   reports previous alcohol use.     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

## 2020-03-25 NOTE — ONCOLOGY
sec    INR 1.2      Recent Labs     03/24/20 2128 03/25/20  1209   COLORU Dark yellow  --    PHUR 6.0  --    CLARITYU Clear  --    SPECGRAV 1.010  --    LEUKOCYTESUR Negative  --    UROBILINOGEN 0.2  --    BILIRUBINUR Moderate  --    BLOODU Negative  --    GLUCOSE  --  112*     RADIOLOGY RESULTS:  Ct Abdomen Pelvis W Iv Contrast Additional Contrast? None    Result Date: 3/24/2020  EXAM:  CT ABDOMEN PELVIS W IV CONTRAST History: Jaundice. Abdominal pain. Technique: Multiple contiguous axial images were obtained of the abdomen and pelvis from an level of the lung bases through the ischial tuberosities with IV contrast. Multiplanar reformats were obtained. Delayed images were obtained. Comparison: None available Findings: Minimal bibasilar atelectasis. Ill-defined 2.4 cm hypodense lesion within the pancreatic head. There is diffuse pancreatic duct dilation measuring up to approximately 8-9 mm within the pancreatic head. The common bile duct is dilated up to 2.5 cm in diameter. There is mild intrahepatic biliary dilatation. The gallbladder is distended and contains a 7 mm hyperdense stone. The spleen, stomach, and adrenal glands are within normal limits. The kidneys enhance uniformly. No urinary tract calculi or hydronephrosis. Urinary bladder is well distended. The prostate is within normal limits. Abdominal aorta is nonaneurysmal  and demonstrates atherosclerotic calcification . No retroperitoneal or abdominal/pelvic lymphadenopathy. No small bowel obstruction. No overt colonic mass or pericolonic inflammation. Appendix is not visualized. No free fluid or free air. Degenerative changes of the spine. Bilateral L5 spondylolysis with minimal anterolisthesis of L5 on S1. No lytic or blastic lesion identified. Ill-defined 2.4 cm hypodense lesion within the head of the pancreas, pancreatic duct dilation, dilation of the common bile duct, and intrahepatic biliary dilatation.  These findings are most concerning for

## 2020-03-26 ENCOUNTER — ANESTHESIA (OUTPATIENT)
Dept: OPERATING ROOM | Age: 85
DRG: 435 | End: 2020-03-26
Payer: MEDICARE

## 2020-03-26 ENCOUNTER — APPOINTMENT (OUTPATIENT)
Dept: GENERAL RADIOLOGY | Age: 85
DRG: 435 | End: 2020-03-26
Attending: INTERNAL MEDICINE
Payer: MEDICARE

## 2020-03-26 ENCOUNTER — ANESTHESIA EVENT (OUTPATIENT)
Dept: OPERATING ROOM | Age: 85
DRG: 435 | End: 2020-03-26
Payer: MEDICARE

## 2020-03-26 VITALS — TEMPERATURE: 95.4 F | OXYGEN SATURATION: 99 % | DIASTOLIC BLOOD PRESSURE: 77 MMHG | SYSTOLIC BLOOD PRESSURE: 121 MMHG

## 2020-03-26 LAB
ALBUMIN SERPL-MCNC: 3.4 G/DL (ref 3.5–4.6)
ALP BLD-CCNC: 486 U/L (ref 35–104)
ALT SERPL-CCNC: 356 U/L (ref 0–41)
ANION GAP SERPL CALCULATED.3IONS-SCNC: 14 MEQ/L (ref 9–15)
AST SERPL-CCNC: 217 U/L (ref 0–40)
BASOPHILS ABSOLUTE: 0 K/UL (ref 0–0.2)
BASOPHILS RELATIVE PERCENT: 0.5 %
BILIRUB SERPL-MCNC: 12.4 MG/DL (ref 0.2–0.7)
BUN BLDV-MCNC: 10 MG/DL (ref 8–23)
CALCIUM SERPL-MCNC: 8.8 MG/DL (ref 8.5–9.9)
CHLORIDE BLD-SCNC: 106 MEQ/L (ref 95–107)
CO2: 22 MEQ/L (ref 20–31)
CREAT SERPL-MCNC: 0.42 MG/DL (ref 0.7–1.2)
EOSINOPHILS ABSOLUTE: 0.1 K/UL (ref 0–0.7)
EOSINOPHILS RELATIVE PERCENT: 2.1 %
GFR AFRICAN AMERICAN: >60
GFR NON-AFRICAN AMERICAN: >60
GLOBULIN: 2.2 G/DL (ref 2.3–3.5)
GLUCOSE BLD-MCNC: 106 MG/DL (ref 70–99)
HCT VFR BLD CALC: 33.7 % (ref 42–52)
HEMOGLOBIN: 11.7 G/DL (ref 14–18)
LYMPHOCYTES ABSOLUTE: 0.8 K/UL (ref 1–4.8)
LYMPHOCYTES RELATIVE PERCENT: 16.4 %
MAGNESIUM: 1.9 MG/DL (ref 1.7–2.4)
MCH RBC QN AUTO: 33.9 PG (ref 27–31.3)
MCHC RBC AUTO-ENTMCNC: 34.6 % (ref 33–37)
MCV RBC AUTO: 98 FL (ref 80–100)
MONOCYTES ABSOLUTE: 0.6 K/UL (ref 0.2–0.8)
MONOCYTES RELATIVE PERCENT: 11.4 %
NEUTROPHILS ABSOLUTE: 3.6 K/UL (ref 1.4–6.5)
NEUTROPHILS RELATIVE PERCENT: 69.6 %
PDW BLD-RTO: 13.8 % (ref 11.5–14.5)
PLATELET # BLD: 151 K/UL (ref 130–400)
POTASSIUM REFLEX MAGNESIUM: 3.1 MEQ/L (ref 3.4–4.9)
RBC # BLD: 3.44 M/UL (ref 4.7–6.1)
SODIUM BLD-SCNC: 142 MEQ/L (ref 135–144)
TOTAL PROTEIN: 5.6 G/DL (ref 6.3–8)
WBC # BLD: 5.1 K/UL (ref 4.8–10.8)

## 2020-03-26 PROCEDURE — 6360000002 HC RX W HCPCS: Performed by: INTERNAL MEDICINE

## 2020-03-26 PROCEDURE — 88305 TISSUE EXAM BY PATHOLOGIST: CPT

## 2020-03-26 PROCEDURE — 43238 EGD US FINE NEEDLE BX/ASPIR: CPT | Performed by: INTERNAL MEDICINE

## 2020-03-26 PROCEDURE — 93010 ELECTROCARDIOGRAM REPORT: CPT | Performed by: INTERNAL MEDICINE

## 2020-03-26 PROCEDURE — 1210000000 HC MED SURG R&B

## 2020-03-26 PROCEDURE — 2580000003 HC RX 258: Performed by: INTERNAL MEDICINE

## 2020-03-26 PROCEDURE — C1753 CATH, INTRAVAS ULTRASOUND: HCPCS | Performed by: INTERNAL MEDICINE

## 2020-03-26 PROCEDURE — 2709999900 HC NON-CHARGEABLE SUPPLY: Performed by: INTERNAL MEDICINE

## 2020-03-26 PROCEDURE — 83735 ASSAY OF MAGNESIUM: CPT

## 2020-03-26 PROCEDURE — 2720000010 HC SURG SUPPLY STERILE: Performed by: INTERNAL MEDICINE

## 2020-03-26 PROCEDURE — 3609018800 HC ERCP DX COLLECTION SPECIMEN BRUSHING/WASHING: Performed by: INTERNAL MEDICINE

## 2020-03-26 PROCEDURE — 3700000000 HC ANESTHESIA ATTENDED CARE: Performed by: INTERNAL MEDICINE

## 2020-03-26 PROCEDURE — 94150 VITAL CAPACITY TEST: CPT

## 2020-03-26 PROCEDURE — 88307 TISSUE EXAM BY PATHOLOGIST: CPT

## 2020-03-26 PROCEDURE — 0DB98ZX EXCISION OF DUODENUM, VIA NATURAL OR ARTIFICIAL OPENING ENDOSCOPIC, DIAGNOSTIC: ICD-10-PCS | Performed by: INTERNAL MEDICINE

## 2020-03-26 PROCEDURE — 74300 X-RAY BILE DUCTS/PANCREAS: CPT

## 2020-03-26 PROCEDURE — C1769 GUIDE WIRE: HCPCS | Performed by: INTERNAL MEDICINE

## 2020-03-26 PROCEDURE — 2500000003 HC RX 250 WO HCPCS: Performed by: NURSE ANESTHETIST, CERTIFIED REGISTERED

## 2020-03-26 PROCEDURE — 2780000010 HC IMPLANT OTHER: Performed by: INTERNAL MEDICINE

## 2020-03-26 PROCEDURE — BF101ZZ FLUOROSCOPY OF BILE DUCTS USING LOW OSMOLAR CONTRAST: ICD-10-PCS | Performed by: INTERNAL MEDICINE

## 2020-03-26 PROCEDURE — 36415 COLL VENOUS BLD VENIPUNCTURE: CPT

## 2020-03-26 PROCEDURE — 3609018500 HC EGD US SCOPE W/ADJACENT STRUCTURES: Performed by: INTERNAL MEDICINE

## 2020-03-26 PROCEDURE — 6360000004 HC RX CONTRAST MEDICATION: Performed by: INTERNAL MEDICINE

## 2020-03-26 PROCEDURE — 0F798DZ DILATION OF COMMON BILE DUCT WITH INTRALUMINAL DEVICE, VIA NATURAL OR ARTIFICIAL OPENING ENDOSCOPIC: ICD-10-PCS | Performed by: INTERNAL MEDICINE

## 2020-03-26 PROCEDURE — 43274 ERCP DUCT STENT PLACEMENT: CPT | Performed by: INTERNAL MEDICINE

## 2020-03-26 PROCEDURE — 7100000001 HC PACU RECOVERY - ADDTL 15 MIN: Performed by: INTERNAL MEDICINE

## 2020-03-26 PROCEDURE — 80053 COMPREHEN METABOLIC PANEL: CPT

## 2020-03-26 PROCEDURE — 7100000000 HC PACU RECOVERY - FIRST 15 MIN: Performed by: INTERNAL MEDICINE

## 2020-03-26 PROCEDURE — 3700000001 HC ADD 15 MINUTES (ANESTHESIA): Performed by: INTERNAL MEDICINE

## 2020-03-26 PROCEDURE — C2625 STENT, NON-COR, TEM W/DEL SY: HCPCS | Performed by: INTERNAL MEDICINE

## 2020-03-26 PROCEDURE — 43239 EGD BIOPSY SINGLE/MULTIPLE: CPT | Performed by: INTERNAL MEDICINE

## 2020-03-26 PROCEDURE — 6360000002 HC RX W HCPCS: Performed by: NURSE ANESTHETIST, CERTIFIED REGISTERED

## 2020-03-26 PROCEDURE — 85025 COMPLETE CBC W/AUTO DIFF WBC: CPT

## 2020-03-26 PROCEDURE — 0FBG8ZX EXCISION OF PANCREAS, VIA NATURAL OR ARTIFICIAL OPENING ENDOSCOPIC, DIAGNOSTIC: ICD-10-PCS | Performed by: INTERNAL MEDICINE

## 2020-03-26 DEVICE — BILIARY STENT WITH NAVIFLEXTM RX DELIVERY SYSTEM
Type: IMPLANTABLE DEVICE | Site: BILE DUCT | Status: FUNCTIONAL
Brand: ADVANIX™ BILIARY

## 2020-03-26 RX ORDER — DEXAMETHASONE SODIUM PHOSPHATE 10 MG/ML
INJECTION INTRAMUSCULAR; INTRAVENOUS PRN
Status: DISCONTINUED | OUTPATIENT
Start: 2020-03-26 | End: 2020-03-26 | Stop reason: SDUPTHER

## 2020-03-26 RX ORDER — FENTANYL CITRATE 50 UG/ML
50 INJECTION, SOLUTION INTRAMUSCULAR; INTRAVENOUS EVERY 10 MIN PRN
Status: DISCONTINUED | OUTPATIENT
Start: 2020-03-26 | End: 2020-03-26 | Stop reason: HOSPADM

## 2020-03-26 RX ORDER — ONDANSETRON 2 MG/ML
INJECTION INTRAMUSCULAR; INTRAVENOUS PRN
Status: DISCONTINUED | OUTPATIENT
Start: 2020-03-26 | End: 2020-03-26 | Stop reason: SDUPTHER

## 2020-03-26 RX ORDER — DIPHENHYDRAMINE HYDROCHLORIDE 50 MG/ML
12.5 INJECTION INTRAMUSCULAR; INTRAVENOUS
Status: DISCONTINUED | OUTPATIENT
Start: 2020-03-26 | End: 2020-03-26 | Stop reason: HOSPADM

## 2020-03-26 RX ORDER — HYDROCODONE BITARTRATE AND ACETAMINOPHEN 5; 325 MG/1; MG/1
1 TABLET ORAL PRN
Status: DISCONTINUED | OUTPATIENT
Start: 2020-03-26 | End: 2020-03-26 | Stop reason: HOSPADM

## 2020-03-26 RX ORDER — POTASSIUM CHLORIDE 7.45 MG/ML
10 INJECTION INTRAVENOUS
Status: DISPENSED | OUTPATIENT
Start: 2020-03-26 | End: 2020-03-26

## 2020-03-26 RX ORDER — METOCLOPRAMIDE HYDROCHLORIDE 5 MG/ML
10 INJECTION INTRAMUSCULAR; INTRAVENOUS
Status: DISCONTINUED | OUTPATIENT
Start: 2020-03-26 | End: 2020-03-26 | Stop reason: HOSPADM

## 2020-03-26 RX ORDER — MAGNESIUM HYDROXIDE 1200 MG/15ML
LIQUID ORAL PRN
Status: DISCONTINUED | OUTPATIENT
Start: 2020-03-26 | End: 2020-03-26 | Stop reason: ALTCHOICE

## 2020-03-26 RX ORDER — FENTANYL CITRATE 50 UG/ML
INJECTION, SOLUTION INTRAMUSCULAR; INTRAVENOUS PRN
Status: DISCONTINUED | OUTPATIENT
Start: 2020-03-26 | End: 2020-03-26 | Stop reason: SDUPTHER

## 2020-03-26 RX ORDER — LIDOCAINE HYDROCHLORIDE 20 MG/ML
INJECTION, SOLUTION INTRAVENOUS PRN
Status: DISCONTINUED | OUTPATIENT
Start: 2020-03-26 | End: 2020-03-26 | Stop reason: SDUPTHER

## 2020-03-26 RX ORDER — HYDROCODONE BITARTRATE AND ACETAMINOPHEN 5; 325 MG/1; MG/1
2 TABLET ORAL PRN
Status: DISCONTINUED | OUTPATIENT
Start: 2020-03-26 | End: 2020-03-26 | Stop reason: HOSPADM

## 2020-03-26 RX ORDER — ONDANSETRON 2 MG/ML
4 INJECTION INTRAMUSCULAR; INTRAVENOUS
Status: DISCONTINUED | OUTPATIENT
Start: 2020-03-26 | End: 2020-03-26 | Stop reason: HOSPADM

## 2020-03-26 RX ORDER — MEPERIDINE HYDROCHLORIDE 25 MG/ML
12.5 INJECTION INTRAMUSCULAR; INTRAVENOUS; SUBCUTANEOUS EVERY 5 MIN PRN
Status: DISCONTINUED | OUTPATIENT
Start: 2020-03-26 | End: 2020-03-26 | Stop reason: HOSPADM

## 2020-03-26 RX ORDER — MAGNESIUM HYDROXIDE 1200 MG/15ML
LIQUID ORAL CONTINUOUS PRN
Status: DISCONTINUED | OUTPATIENT
Start: 2020-03-26 | End: 2020-03-26 | Stop reason: ALTCHOICE

## 2020-03-26 RX ORDER — ROCURONIUM BROMIDE 10 MG/ML
INJECTION, SOLUTION INTRAVENOUS PRN
Status: DISCONTINUED | OUTPATIENT
Start: 2020-03-26 | End: 2020-03-26 | Stop reason: SDUPTHER

## 2020-03-26 RX ORDER — PROPOFOL 10 MG/ML
INJECTION, EMULSION INTRAVENOUS PRN
Status: DISCONTINUED | OUTPATIENT
Start: 2020-03-26 | End: 2020-03-26 | Stop reason: SDUPTHER

## 2020-03-26 RX ORDER — GLYCOPYRROLATE 1 MG/5 ML
SYRINGE (ML) INTRAVENOUS PRN
Status: DISCONTINUED | OUTPATIENT
Start: 2020-03-26 | End: 2020-03-26 | Stop reason: SDUPTHER

## 2020-03-26 RX ADMIN — SODIUM CHLORIDE: 9 INJECTION, SOLUTION INTRAVENOUS at 20:27

## 2020-03-26 RX ADMIN — SODIUM CHLORIDE: 9 INJECTION, SOLUTION INTRAVENOUS at 15:43

## 2020-03-26 RX ADMIN — POTASSIUM CHLORIDE 10 MEQ: 7.46 INJECTION, SOLUTION INTRAVENOUS at 18:52

## 2020-03-26 RX ADMIN — SODIUM CHLORIDE: 9 INJECTION, SOLUTION INTRAVENOUS at 08:47

## 2020-03-26 RX ADMIN — SUGAMMADEX 158 MG: 100 INJECTION, SOLUTION INTRAVENOUS at 17:23

## 2020-03-26 RX ADMIN — PROPOFOL 150 MG: 10 INJECTION, EMULSION INTRAVENOUS at 15:48

## 2020-03-26 RX ADMIN — ROCURONIUM BROMIDE 50 MG: 10 INJECTION INTRAVENOUS at 15:48

## 2020-03-26 RX ADMIN — POTASSIUM CHLORIDE 10 MEQ: 7.46 INJECTION, SOLUTION INTRAVENOUS at 14:16

## 2020-03-26 RX ADMIN — DEXAMETHASONE SODIUM PHOSPHATE 8 MG: 10 INJECTION INTRAMUSCULAR; INTRAVENOUS at 16:23

## 2020-03-26 RX ADMIN — Medication 10 ML: at 08:49

## 2020-03-26 RX ADMIN — POTASSIUM CHLORIDE 10 MEQ: 7.46 INJECTION, SOLUTION INTRAVENOUS at 10:56

## 2020-03-26 RX ADMIN — FENTANYL CITRATE 50 MCG: 50 INJECTION, SOLUTION INTRAMUSCULAR; INTRAVENOUS at 15:48

## 2020-03-26 RX ADMIN — ONDANSETRON 4 MG: 2 INJECTION INTRAMUSCULAR; INTRAVENOUS at 17:21

## 2020-03-26 RX ADMIN — PHENYLEPHRINE HYDROCHLORIDE 100 MCG: 10 INJECTION INTRAVENOUS at 16:17

## 2020-03-26 RX ADMIN — SODIUM CHLORIDE: 9 INJECTION, SOLUTION INTRAVENOUS at 16:58

## 2020-03-26 RX ADMIN — LIDOCAINE HYDROCHLORIDE 60 MG: 20 INJECTION, SOLUTION INTRAVENOUS at 15:48

## 2020-03-26 RX ADMIN — Medication 0.2 MG: at 17:13

## 2020-03-26 RX ADMIN — POTASSIUM CHLORIDE 10 MEQ: 7.46 INJECTION, SOLUTION INTRAVENOUS at 13:16

## 2020-03-26 ASSESSMENT — PULMONARY FUNCTION TESTS
PIF_VALUE: 13
PIF_VALUE: 7
PIF_VALUE: 13
PIF_VALUE: 12
PIF_VALUE: 13
PIF_VALUE: 10
PIF_VALUE: 12
PIF_VALUE: 14
PIF_VALUE: 0
PIF_VALUE: 13
PIF_VALUE: 8
PIF_VALUE: 13
PIF_VALUE: 8
PIF_VALUE: 12
PIF_VALUE: 14
PIF_VALUE: 13
PIF_VALUE: 12
PIF_VALUE: 14
PIF_VALUE: 13
PIF_VALUE: 12
PIF_VALUE: 13
PIF_VALUE: 10
PIF_VALUE: 13
PIF_VALUE: 14
PIF_VALUE: 13
PIF_VALUE: 13
PIF_VALUE: 15
PIF_VALUE: 13
PIF_VALUE: 14
PIF_VALUE: 0
PIF_VALUE: 1
PIF_VALUE: 10
PIF_VALUE: 14
PIF_VALUE: 13
PIF_VALUE: 14
PIF_VALUE: 13
PIF_VALUE: 15
PIF_VALUE: 4
PIF_VALUE: 12
PIF_VALUE: 14
PIF_VALUE: 14
PIF_VALUE: 12
PIF_VALUE: 13
PIF_VALUE: 14
PIF_VALUE: 14
PIF_VALUE: 13
PIF_VALUE: 15
PIF_VALUE: 13
PIF_VALUE: 11
PIF_VALUE: 12
PIF_VALUE: 10
PIF_VALUE: 12
PIF_VALUE: 13
PIF_VALUE: 0
PIF_VALUE: 12
PIF_VALUE: 13
PIF_VALUE: 12
PIF_VALUE: 13
PIF_VALUE: 12
PIF_VALUE: 1
PIF_VALUE: 12
PIF_VALUE: 12
PIF_VALUE: 1
PIF_VALUE: 12
PIF_VALUE: 13
PIF_VALUE: 12
PIF_VALUE: 12
PIF_VALUE: 14
PIF_VALUE: 13
PIF_VALUE: 13
PIF_VALUE: 12
PIF_VALUE: 14
PIF_VALUE: 12
PIF_VALUE: 12
PIF_VALUE: 1
PIF_VALUE: 12
PIF_VALUE: 12
PIF_VALUE: 14
PIF_VALUE: 2
PIF_VALUE: 12
PIF_VALUE: 13
PIF_VALUE: 13
PIF_VALUE: 12
PIF_VALUE: 13

## 2020-03-26 ASSESSMENT — PAIN SCALES - GENERAL
PAINLEVEL_OUTOF10: 0
PAINLEVEL_OUTOF10: 0

## 2020-03-26 NOTE — OP NOTE
ENDOSCOPIC ULTRASOUND (EUS) REPORT    Patient: Arnol Osorio   : 1935    Procedure: Endoscopic ultrasonography examination with fine-needle biopsy of pancreatic head mass lesion    Date: 3/26/2020    Surgeon:  Leonides Barber MD    Preoperative Diagnosis:  Mass in the head of the pancreas    Postoperative Diagnosis:  2.9 x 2.7 cm heterogeneous mass in the head of the pancreas, dilated tortuous pancreatic duct, dilated common bile duct with stent in place. Anesthesia: General    Indications: 81y.o. year old male presents for EUS today with Obstructive jaundice with dilated common bile duct. Pancreatic head mass concerning for neoplasia. CONSENT:  Informed consent was obtained. The anesthesia and procedure along with the risks, benefits, and alternatives were discussed by myself and the nursing staff to the satisfaction of the patient/family with ample opportunity to ask and answer questions. We discussed the risks associated with this procedure including (but not limited to) bleeding, perforation, anesthesia reaction, post-procedural pancreatitis, cholangitis, or even death. PREMEDICATIONS AND OTHER MEDICATIONS:  Provided by anesthesia service. DETAILS OF PROCEDURE:  The patient was brought to the OR unit and provided anesthesia. The patient was placed in the left lateral prone position. A Olympus gastroscope, Olympus linear echoendoscope inserted into the oropharynx and advanced under direct vision into and through the esophagus, stomach, and duodenum. An endosonoscope with 7. 5MHz processor and color doppler were used throughout the procedure. I performed real time sonographic image interpretation without the assistance of a radiologist.     FINDINGS:  EUS:  Pancreas: A hypoechoic heterogeneous mass, 29.8 x 27.2 mm in size was noted in the head of the pancreas. The lesion is oval in shape, has ill-defined regular borders. The mass has multiple cystic areas concerning for IPMN.   No adjucent

## 2020-03-26 NOTE — PLAN OF CARE
Nutrition Problem: Inadequate oral intake  Intervention: Food and/or Nutrient Delivery: Continue NPO(await advancement of diet pending GI consult)  Nutritional Goals: Tolerance to po diet with >75% intake, when po diet is ordered per GI. Stable weight.

## 2020-03-26 NOTE — ANESTHESIA POSTPROCEDURE EVALUATION
Department of Anesthesiology  Postprocedure Note    Patient: Carlitos Guevara  MRN: [id number]  YOB: 1935  Date of evaluation: 3/26/2020  Time:  5:43 PM     Procedure Summary     Date:  03/26/20 Room / Location:  [address]    Anesthesia Start:  2825 Anesthesia Stop:  5565    Procedures:       EUS (N/A )      EGD/ ERCP (N/A ) Diagnosis:  (INPATIENT)    Surgeon:  Mau Davenport MD Responsible Provider:  SAMINA Guevara CRNA    Anesthesia Type:  general ASA Status:  3          Anesthesia Type: general    Leobardo Phase I: Leobardo Score: 9    Leobardo Phase II:      Last vitals: Reviewed and per EMR flowsheets.        Anesthesia Post Evaluation    Patient location during evaluation: bedside  Patient participation: complete - patient participated  Level of consciousness: awake and awake and alert  Pain score: 0  Airway patency: patent  Nausea & Vomiting: no nausea and no vomiting  Complications: no  Cardiovascular status: blood pressure returned to baseline and hemodynamically stable  Respiratory status: acceptable  Hydration status: euvolemic

## 2020-03-26 NOTE — ACP (ADVANCE CARE PLANNING)
Patient states that he/she has Advanced Directive documentation and the family has been requested to bring in the documents to the hospital.    Time spent (minutes): [address]

## 2020-03-26 NOTE — ANESTHESIA PRE PROCEDURE
[id number], BEART, [id number]     Type & Screen (If Applicable):  No results found for: [facility]    Anesthesia Evaluation  Patient summary reviewed and Nursing notes reviewed no history of anesthetic complications:   Airway: Mallampati: II  TM distance: >3 FB   Neck ROM: full  Mouth opening: > = 3 FB Dental: normal exam         Pulmonary:Negative Pulmonary ROS                              Cardiovascular:    (+) hyperlipidemia      ECG reviewed               Beta Blocker:  Not on Beta Blocker         Neuro/Psych:   Negative Neuro/Psych ROS              GI/Hepatic/Renal:   (+) liver disease:,          ROS comment: Obstructive jaundice. Endo/Other:    (+) hyperthyroidism, blood dyscrasia: anemia, arthritis: OA., malignancy/cancer (bladder cancer). Abdominal:           Vascular: negative vascular ROS. Anesthesia Plan      general     ASA 3       Induction: intravenous. MIPS: Prophylactic antiemetics administered. Anesthetic plan and risks discussed with patient. Plan discussed with CRNA.     Attending anesthesiologist reviewed and agrees with Jose Martell MD   3/26/2020

## 2020-03-26 NOTE — OP NOTE
identified. The visualized portions of the esophagus and stomach were otherwise normal  · The papilla was visualized easily in the second portion of the duodenum. It appeared to be abnormal, due to prominence and abnormal periampullary mucosa with some oozing raising a concern for tumor infiltration. Biopsy was performed of the mucosa in the periampullary region to evaluate for the presence of tumor infiltration. · The scope was withdrawn and duodenoscope inserted with some difficulty. Cholangiogram/Interventions:  · The biliary orifice was then selectively cannulated and the biliary tree was opacified using a tapered tip, pull - type sphincterotome to confirm location within the duct. · Initial cholangiogram revealed significantly dilated common bile duct without any obvious narrowing or stricture, on further contrast insufflation shelflike obstruction noted in the distal most bile duct. · A 7 mm biliary sphinctertomy was performed in the 12 o'clock direction using the tapered sphincterotome and blended cautery current over a guidewire. No bleeding noted from the sphincterotomy site. · Occlusion cholangiogram was performed to further evaluate for stricture, apart from significantly dilated common bile duct no obvious stricture or filling defects identified. ·  10Fr x 7 cm plastic stent was placed successfully in the bile duct, good bile flow was noted through the stent. · At this point, the cannulas were withdrawn. The duodenum and stomach were decompressed and the scope was withdrawn from the patient. · X-ray images and real-time fluoroscopy were carefully interpreted on the spot by the endoscopist during the procedure in the absence of a radiologist.  These interpretations helped guide the decision-making and interventions throughout the course of the procedure.        Pancreatogram:  · Intentionally not performed    Patient tolerated procedure well    EBL: None  Complication: None  Specimen Sent: Yes    Impression:  - Distal bile duct narrowing, abnormal ampulla, abnormal mucosa in the very ampullary region, Schatzki's ring, dilated upper esophagus, status post successful sphincterotomy and stent placement    RECOMMENDATIONS:    1. Resume pre-procedure medications and orders. Okay to start clear liquid diet and slowly advance as tolerated. 2. Please notify GI service with any acute changes in the patient's clinical status (fever, hypotension, severe pain, bleeding, etc). 3. Schedule ERCP in 6 to 8 weeks for stent removal/exchange. Please call GI scheduling office @[phone] to schedule this procedure prior to patient leaving the unit. 4.   Proceed to EUS +/- FNA next.     Stacy Dyer MD  3/26/2020

## 2020-03-26 NOTE — DISCHARGE SUMMARY
lymphadenopathy. No thoracic aortic aneurysm or dissection. 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 Heart size is within normal limits. No significant pericardial effusion. Esophagus is within normal limits. No suspicious pulmonary nodule or mass. A triangle-shaped 4 mm nodular density along the right minor fissure is most compatible with the pulmonary lymph node. No consolidation, pleural effusion, or pneumothorax. Airways are patent. No bronchiectasis. Mild degenerative changes of the thoracic spine. No lytic or blastic lesion identified. Degenerative changes of the shoulders. Please see CT abdomen/pelvis report from March 24, 2020 regarding abdominal findings. No acute intrathoracic process or evidence of metastatic disease. All CT scans at this facility use dose modulation, iterative reconstruction, and/or weight based dosing when appropriate to reduce radiation dose to as low as reasonably achievable. Mri Abdomen W Wo Contrast    Result Date: 3/26/2020  EXAM: MRI ABDOMEN W WO CONTRAST HISTORY: Pancreatic head mass. Jaundice. Abdominal pain. TECHNIQUE: Multiplanar multisequence MRI of the abdomen was performed without and with contrast COMPARISON: CT abdomen pelvis from March 24, 2020 FINDINGS: Within the head of the pancreas is a hypoenhancing mass measuring approximately 3 cm in AP dimension by 3 cm in transverse dimension by 2 cm in craniocaudal dimension. This mass does not encase the superior mesenteric artery or celiac axis no upper abdominal lymphadenopathy is identified. The pancreatic duct is dilated proximal 9 mm in diameter. The common bile duct is dilated up to approximately 2 cm. There is moderate intrahepatic biliary dilatation. The gallbladder is mildly distended and contains a 6 mm gallstone. No gallbladder wall thickening or pericholecystic fluid. The spleen, stomach, kidneys, and adrenal glands appear within normal limits.      3 x 3 x 2 cm pancreatic head

## 2020-03-26 NOTE — ONCOLOGY
radiation dose to as low as reasonably achievable. Mri Abdomen W Wo Contrast    Result Date: 3/26/2020  EXAM: MRI ABDOMEN W WO CONTRAST HISTORY: Pancreatic head mass. Jaundice. Abdominal pain. TECHNIQUE: Multiplanar multisequence MRI of the abdomen was performed without and with contrast COMPARISON: CT abdomen pelvis from March 24, 2020 FINDINGS: Within the head of the pancreas is a hypoenhancing mass measuring approximately 3 cm in AP dimension by 3 cm in transverse dimension by 2 cm in craniocaudal dimension. This mass does not encase the superior mesenteric artery or celiac axis no upper abdominal lymphadenopathy is identified. The pancreatic duct is dilated proximal 9 mm in diameter. The common bile duct is dilated up to approximately 2 cm. There is moderate intrahepatic biliary dilatation. The gallbladder is mildly distended and contains a 6 mm gallstone. No gallbladder wall thickening or pericholecystic fluid. The spleen, stomach, kidneys, and adrenal glands appear within normal limits. 3 x 3 x 2 cm pancreatic head mass does not encase the superior mesenteric artery or celiac axis. No upper abdominal lymphadenopathy identified. Common bile duct dilation, mild dilation of the gallbladder, and moderate intrahepatic biliary dilatation as detailed. Cholelithiasis. Ct Abdomen Pelvis W Iv Contrast Additional Contrast? None    Result Date: 3/24/2020  EXAM:  CT ABDOMEN PELVIS W IV CONTRAST History: Jaundice. Abdominal pain. Technique: Multiple contiguous axial images were obtained of the abdomen and pelvis from an level of the lung bases through the ischial tuberosities with IV contrast. Multiplanar reformats were obtained. Delayed images were obtained. Comparison: None available Findings: Minimal bibasilar atelectasis. Ill-defined 2.4 cm hypodense lesion within the pancreatic head. There is diffuse pancreatic duct dilation measuring up to approximately 8-9 mm within the pancreatic head.  The common bile duct is dilated up to 2.5 cm in diameter. There is mild intrahepatic biliary dilatation. The gallbladder is distended and contains a 7 mm hyperdense stone. The spleen, stomach, and adrenal glands are within normal limits. The kidneys enhance uniformly. No urinary tract calculi or hydronephrosis. Urinary bladder is well distended. The prostate is within normal limits. Abdominal aorta is nonaneurysmal  and demonstrates atherosclerotic calcification . No retroperitoneal or abdominal/pelvic lymphadenopathy. No small bowel obstruction. No overt colonic mass or pericolonic inflammation. Appendix is not visualized. No free fluid or free air. Degenerative changes of the spine. Bilateral L5 spondylolysis with minimal anterolisthesis of L5 on S1. No lytic or blastic lesion identified. Ill-defined 2.4 cm hypodense lesion within the head of the pancreas, pancreatic duct dilation, dilation of the common bile duct, and intrahepatic biliary dilatation. These findings are most concerning for pancreatic head malignancy. This would be better evaluated with MRI of the abdomen with contrast. Cholelithiasis. All CT scans at this facility use dose modulation, iterative reconstruction, and/or weight based dosing when appropriate to reduce radiation dose to as low as reasonably achievable. Xr Chest Portable    Result Date: 3/24/2020  Portable chest radiograph History: Weakness. Loss of appetite. Fatigue. Technique: AP portable view of the chest obtained. Comparison: Chest radiographs from January 21, 2020 Findings: The cardiomediastinal silhouette is within normal limits. No pneumothorax, pleural effusion, or consolidation. The lungs appear hyperinflated. Scattered areas of lung scarring bilaterally. Linear opacities of both lung bases likely related to atelectasis. Degenerative changes of the spine and both shoulders. No acute intrathoracic process. Findings suggesting COPD with lung scarring.     ASSESSMENT AND PLAN:  3cm HOP

## 2020-03-27 ENCOUNTER — APPOINTMENT (OUTPATIENT)
Dept: MRI IMAGING | Age: 85
DRG: 435 | End: 2020-03-27
Attending: INTERNAL MEDICINE
Payer: MEDICARE

## 2020-03-27 VITALS
DIASTOLIC BLOOD PRESSURE: 73 MMHG | RESPIRATION RATE: 18 BRPM | OXYGEN SATURATION: 100 % | WEIGHT: 173.94 LBS | HEIGHT: 73 IN | SYSTOLIC BLOOD PRESSURE: 117 MMHG | BODY MASS INDEX: 23.05 KG/M2 | TEMPERATURE: 98.4 F | HEART RATE: 84 BPM

## 2020-03-27 LAB
ALBUMIN SERPL-MCNC: 3.1 G/DL (ref 3.5–4.6)
ALP BLD-CCNC: 453 U/L (ref 35–104)
ALT SERPL-CCNC: 302 U/L (ref 0–41)
ANION GAP SERPL CALCULATED.3IONS-SCNC: 15 MEQ/L (ref 9–15)
AST SERPL-CCNC: 148 U/L (ref 0–40)
BASOPHILS ABSOLUTE: 0 K/UL (ref 0–0.2)
BASOPHILS RELATIVE PERCENT: 0.1 %
BILIRUB SERPL-MCNC: 7.4 MG/DL (ref 0.2–0.7)
BUN BLDV-MCNC: 13 MG/DL (ref 8–23)
CALCIUM SERPL-MCNC: 8.5 MG/DL (ref 8.5–9.9)
CHLORIDE BLD-SCNC: 104 MEQ/L (ref 95–107)
CO2: 20 MEQ/L (ref 20–31)
CREAT SERPL-MCNC: 0.53 MG/DL (ref 0.7–1.2)
EOSINOPHILS ABSOLUTE: 0 K/UL (ref 0–0.7)
EOSINOPHILS RELATIVE PERCENT: 0 %
GFR AFRICAN AMERICAN: >60
GFR NON-AFRICAN AMERICAN: >60
GLOBULIN: 2.4 G/DL (ref 2.3–3.5)
GLUCOSE BLD-MCNC: 113 MG/DL (ref 70–99)
HCT VFR BLD CALC: 33.6 % (ref 42–52)
HEMOGLOBIN: 11.7 G/DL (ref 14–18)
LYMPHOCYTES ABSOLUTE: 0.8 K/UL (ref 1–4.8)
LYMPHOCYTES RELATIVE PERCENT: 13 %
MCH RBC QN AUTO: 34 PG (ref 27–31.3)
MCHC RBC AUTO-ENTMCNC: 34.7 % (ref 33–37)
MCV RBC AUTO: 98.1 FL (ref 80–100)
MONOCYTES ABSOLUTE: 0.5 K/UL (ref 0.2–0.8)
MONOCYTES RELATIVE PERCENT: 7.8 %
NEUTROPHILS ABSOLUTE: 4.9 K/UL (ref 1.4–6.5)
NEUTROPHILS RELATIVE PERCENT: 79.1 %
PDW BLD-RTO: 14 % (ref 11.5–14.5)
PLATELET # BLD: 154 K/UL (ref 130–400)
POTASSIUM REFLEX MAGNESIUM: 3.7 MEQ/L (ref 3.4–4.9)
RBC # BLD: 3.43 M/UL (ref 4.7–6.1)
SODIUM BLD-SCNC: 139 MEQ/L (ref 135–144)
TOTAL PROTEIN: 5.5 G/DL (ref 6.3–8)
WBC # BLD: 6.2 K/UL (ref 4.8–10.8)

## 2020-03-27 PROCEDURE — 80053 COMPREHEN METABOLIC PANEL: CPT

## 2020-03-27 PROCEDURE — 86301 IMMUNOASSAY TUMOR CA 19-9: CPT

## 2020-03-27 PROCEDURE — 85025 COMPLETE CBC W/AUTO DIFF WBC: CPT

## 2020-03-27 PROCEDURE — 36415 COLL VENOUS BLD VENIPUNCTURE: CPT

## 2020-03-27 ASSESSMENT — PAIN SCALES - GENERAL: PAINLEVEL_OUTOF10: 0

## 2020-03-27 NOTE — PROGRESS NOTES
Assessment completed this shift. Alert and oriented x4. Denies pain or nausea at present. Skin jaundiced and sclera yellow. Pt washing up at present with assistance from [address] [place] [address]. Wife provided with HIPPA code with [id number] Ara Benitez from pt. ERCP scheduled for 1400.   Electronically signed by He Cedeno RN on 3/26/2020 at 1:08 PM
Hospitalist Progress Note      PCP: Abhishek Palma MD    Date of Admission: 3/25/2020    Chief Complaint:    No chief complaint on file. Subjective:  Patient denies fevers, chills, swetas, CP; eager to proceed. 12 point ROS negative other than mentioned above     Medications:  Reviewed    Infusion Medications    sodium chloride 75 mL/hr at 03/26/20 2027     Scheduled Medications    sodium chloride flush  10 mL Intravenous 2 times per day    [Held by provider] enoxaparin  40 mg Subcutaneous Daily     PRN Meds: sodium chloride flush, polyethylene glycol, promethazine **OR** ondansetron, potassium chloride **OR** potassium alternative oral replacement **OR** potassium chloride, magnesium sulfate      Intake/Output Summary (Last 24 hours) at 3/27/2020 1255  Last data filed at 3/26/2020 1925  Gross per 24 hour   Intake 700 ml   Output 1300 ml   Net -600 ml       Exam:    /73   Pulse 84   Temp 98.4 °F (36.9 °C) (Axillary)   Resp 18   Ht 6' 1\" (1.854 m)   Wt 173 lb 15.1 oz (78.9 kg)   SpO2 100%   BMI 22.95 kg/m²     General appearance: alert, appears stated age and cooperative  Skin: Yellow hue  HEENT:  Scleral icterus  Neck: supple, symmetrical, trachea midline  Lungs: clear to auscultation bilaterally  Heart: regular rate and rhythm  Abdomen: soft, non-tender; bowel sounds normal; no masses,  no organomegaly  Extremities: extremities normal, atraumatic, no cyanosis or edema  Neurologic: Mental status: Alert, oriented, thought content appropriate.    CN 2-12 intact       Labs:   Recent Labs     03/25/20  1209 03/26/20  0609 03/27/20  0542   WBC 6.3 5.1 6.2   HGB 12.1* 11.7* 11.7*   HCT 35.2* 33.7* 33.6*    151 154     Recent Labs     03/25/20  1209 03/26/20  0609 03/27/20  0542    142 139   K 3.7 3.1* 3.7    106 104   CO2 21 22 20   BUN 16 10 13   CREATININE 0.45* 0.42* 0.53*   CALCIUM 9.3 8.8 8.5     Recent Labs     03/25/20  1209 03/26/20  0609 03/27/20  0542   * 217* 148*
Pt assessment and vitals complete and stable. Pt tolerated diet without problems, pt denies pain at this time, will continue to monitor.
Pt npo now  Tolerated clear liquids well prior
This patient has a Living Will Declaration and a copy has been requested from the family.
To 4 west via bed-condition stable.
Transport here to take pt to procedure via bed. Spoke with short stay nurse about potassium infusing. Sent next bag down via tube.   Electronically signed by He Cedeno RN on 3/26/2020 at 1:57 PM
lb 15.1 oz (78.9 kg)(bedscale)  · Usual Body Wt: 178 lb (80.7 kg)(12-20-19 stated)  · % Weight Change:  ,  ~2% in 3 months  · Ideal Body Wt: 184 lb (83.5 kg), % Ideal Body 95%  · BMI Classification: BMI 18.5 - 24.9 Normal Weight    Nutrition Interventions:   Continue NPO(await advancement of diet pending GI consult)  Continued Inpatient Monitoring    Nutrition Evaluation:   · Evaluation: Goals set   · Goals: Tolerance to po diet with >75% intake, when po diet is ordered per GI. Stable weight.     · Monitoring: Nutrition Progression, Weight, Pertinent Labs      Electronically signed by Alexi Martins RD, LD on 3/26/20 at 11:54 AM EDT

## 2020-03-27 NOTE — PLAN OF CARE
Problem: Activity:  Goal: Risk for activity intolerance will decrease  Description: Risk for activity intolerance will decrease  3/27/2020 0053 by Octavia Gomes RN  Outcome: Ongoing  3/26/2020 1304 by Mirella Pisano RN  Outcome: Ongoing     Problem: Bowel/Gastric:  Goal: Bowel function will improve  Description: Bowel function will improve  3/27/2020 0053 by Octavia Gomes RN  Outcome: Ongoing  3/26/2020 1304 by Mirella Pisano RN  Outcome: Ongoing     Problem:  Bowel/Gastric:  Goal: Occurrences of nausea will decrease  Description: Occurrences of nausea will decrease  3/27/2020 0053 by Octavia Gomes RN  Outcome: Ongoing     Problem: Pain:  Goal: Pain level will decrease  Description: Pain level will decrease  3/27/2020 0053 by Octavia Gomes RN  Outcome: Ongoing  3/26/2020 1304 by Mirella Pisano RN  Outcome: Ongoing     Problem: Nutrition  Goal: Optimal nutrition therapy  3/27/2020 0053 by Octavia Gomes RN  Outcome: Ongoing  3/26/2020 1304 by Mirella Pisano RN  Outcome: Ongoing

## 2020-03-28 LAB — CA 19-9: 1473 U/ML (ref 0–35)

## 2020-03-30 ENCOUNTER — TELEPHONE (OUTPATIENT)
Dept: GASTROENTEROLOGY | Age: 85
End: 2020-03-30

## 2020-03-30 LAB
BLOOD CULTURE, ROUTINE: NORMAL
CULTURE, BLOOD 2: NORMAL

## 2020-03-30 NOTE — TELEPHONE ENCOUNTER
The patient wife Yan Wood) called. The patient has a consult with Artist Lombard today, his pathology report is not back yet from Dr. Chey Samson. The patient was told to call [address] [place] [address] to see if they would need to postpone the patient consult. They are scheduled for a F/U with Dr. Chey Samson on 4/3/20.

## 2020-03-30 NOTE — TELEPHONE ENCOUNTER
Called and talked to daughter, Sha Munir and talked with Dr. Magy Pérez as well.   Thanks  [facility]

## 2020-04-03 ENCOUNTER — VIRTUAL VISIT (OUTPATIENT)
Dept: GASTROENTEROLOGY | Age: 85
End: 2020-04-03
Payer: MEDICARE

## 2020-04-03 PROCEDURE — 99443 PR PHYS/QHP TELEPHONE EVALUATION 21-30 MIN: CPT | Performed by: INTERNAL MEDICINE

## 2020-04-03 NOTE — PROGRESS NOTES
Naproxen Sodium (ALEVE) 220 MG CAPS Take 1 capsule by mouth as needed for Pain Yes Historical Provider, MD       Social History     Tobacco Use    Smoking status: Never Smoker    Smokeless tobacco: Never Used   Substance Use Topics    Alcohol use: Not Currently    Drug use: Never      Allergies   Allergen Reactions    Menthol (Topical Analgesic)      Skin reaction \"bubble up\"    Zolpidem      Other reaction(s): Hallucinations    Amben [Aminobenzoate] Rash   ,   Past Medical History:   Diagnosis Date    Cancer [facility])     prostate and bladder     Colon cancer ([facility] [address]    ,   Past Surgical History:   Procedure Laterality Date    COLONOSCOPY  01/23/2019    ccf    ENDOSCOPIC ULTRASOUND (LOWER) N/A 3/26/2020    EUS performed by Theoplis Brittle, MD at [address] [place] [address] ERCP N/A 3/26/2020    EGD/ ERCP performed by Theoplis Brittle, MD at [facility]      x2    ,   Family History   Problem Relation Age of Onset    Colon Cancer Mother      Limited information on physical examination: Due to this being a telehealth visit, physical examination could not be performed    Laboratory, Pathology, Radiology reviewed indetail with relevant important investigations summarized below:  Lab Results   Component Value Date    WBC 6.2 03/27/2020    HGB 11.7 (L) 03/27/2020    HCT 33.6 (L) 03/27/2020    MCV 98.1 03/27/2020     03/27/2020     Lab Results   Component Value Date     (H) 03/27/2020     (H) 03/27/2020    ALKPHOS 453 (H) 03/27/2020    BILITOT 7.4 (H) 03/27/2020     Assessment and Plan:  81 y.o. male with 3 cm pancreatic head mass, adenocarcinoma confirmed on fine-needle biopsy with EUS, CA-[id number].  - Detailed discussion regarding chemotherapy and risk. Patient advised to not use COVID as a reason not to undergo chemotherapy. Patient advised to liaise with Dr. Mark Mancilla and discuss options.  Consider referral to Dr. Porter Friend( pancreatico-biliary surgeon at [address] [place] [address])  -ERCP in 6 to 8

## 2020-05-15 ENCOUNTER — VIRTUAL VISIT (OUTPATIENT)
Dept: GASTROENTEROLOGY | Age: 85
End: 2020-05-15
Payer: MEDICARE

## 2020-05-15 ENCOUNTER — HOSPITAL ENCOUNTER (OUTPATIENT)
Age: 85
Setting detail: SPECIMEN
Discharge: HOME | End: 2020-05-15
Payer: MEDICARE

## 2020-05-15 ENCOUNTER — NURSE ONLY (OUTPATIENT)
Dept: PRIMARY CARE CLINIC | Age: 85
End: 2020-05-15

## 2020-05-15 DIAGNOSIS — Z01.818 ENCOUNTER FOR PREADMISSION TESTING: ICD-10-CM

## 2020-05-15 PROCEDURE — 99443 PR PHYS/QHP TELEPHONE EVALUATION 21-30 MIN: CPT | Performed by: INTERNAL MEDICINE

## 2020-05-15 PROCEDURE — U0003 INFECTIOUS AGENT DETECTION BY NUCLEIC ACID (DNA OR RNA); SEVERE ACUTE RESPIRATORY SYNDROME CORONAVIRUS 2 (SARS-COV-2) (CORONAVIRUS DISEASE [COVID-19]), AMPLIFIED PROBE TECHNIQUE, MAKING USE OF HIGH THROUGHPUT TECHNOLOGIES AS DESCRIBED BY CMS-2020-01-R: HCPCS

## 2020-05-15 RX ORDER — ASPIRIN 81 MG/1
81 TABLET, CHEWABLE ORAL DAILY
COMMUNITY
End: 2020-08-17

## 2020-05-15 RX ORDER — AZITHROMYCIN 250 MG/1
TABLET, FILM COATED ORAL
COMMUNITY
Start: 2020-04-28 | End: 2020-07-08 | Stop reason: ALTCHOICE

## 2020-05-15 RX ORDER — PANCRELIPASE 36000; 180000; 114000 [USP'U]/1; [USP'U]/1; [USP'U]/1
CAPSULE, DELAYED RELEASE PELLETS ORAL
Qty: 250 CAPSULE | Refills: 3 | Status: SHIPPED | OUTPATIENT
Start: 2020-05-15

## 2020-05-15 RX ORDER — MV-MIN/FA/VIT K/LUTEIN/ZEAXANT 200MCG-5MG
CAPSULE ORAL
COMMUNITY
Start: 2014-08-18 | End: 2020-07-28

## 2020-05-18 LAB
SARS-COV-2: NOT DETECTED
SOURCE: NORMAL

## 2020-05-20 ENCOUNTER — ANESTHESIA EVENT (OUTPATIENT)
Dept: ENDOSCOPY | Age: 85
End: 2020-05-20
Payer: MEDICARE

## 2020-05-21 ENCOUNTER — ANESTHESIA (OUTPATIENT)
Dept: ENDOSCOPY | Age: 85
End: 2020-05-21
Payer: MEDICARE

## 2020-05-21 ENCOUNTER — HOSPITAL ENCOUNTER (OUTPATIENT)
Age: 85
Setting detail: OUTPATIENT SURGERY
Discharge: HOME | End: 2020-05-21
Attending: INTERNAL MEDICINE | Admitting: INTERNAL MEDICINE
Payer: MEDICARE

## 2020-05-21 ENCOUNTER — ANCILLARY PROCEDURE (OUTPATIENT)
Dept: ENDOSCOPY | Age: 85
End: 2020-05-21
Attending: INTERNAL MEDICINE
Payer: MEDICARE

## 2020-05-21 ENCOUNTER — HOSPITAL ENCOUNTER (OUTPATIENT)
Dept: GENERAL RADIOLOGY | Age: 85
Discharge: HOME | End: 2020-05-23
Attending: INTERNAL MEDICINE
Payer: MEDICARE

## 2020-05-21 VITALS
BODY MASS INDEX: 22.53 KG/M2 | WEIGHT: 170 LBS | SYSTOLIC BLOOD PRESSURE: 173 MMHG | DIASTOLIC BLOOD PRESSURE: 88 MMHG | HEART RATE: 67 BPM | TEMPERATURE: 97.6 F | HEIGHT: 73 IN | RESPIRATION RATE: 14 BRPM

## 2020-05-21 VITALS — DIASTOLIC BLOOD PRESSURE: 114 MMHG | OXYGEN SATURATION: 100 % | SYSTOLIC BLOOD PRESSURE: 190 MMHG

## 2020-05-21 PROCEDURE — 6360000002 HC RX W HCPCS: Performed by: NURSE ANESTHETIST, CERTIFIED REGISTERED

## 2020-05-21 PROCEDURE — 7100000010 HC PHASE II RECOVERY - FIRST 15 MIN: Performed by: INTERNAL MEDICINE

## 2020-05-21 PROCEDURE — 7100000000 HC PACU RECOVERY - FIRST 15 MIN: Performed by: INTERNAL MEDICINE

## 2020-05-21 PROCEDURE — C1769 GUIDE WIRE: HCPCS | Performed by: INTERNAL MEDICINE

## 2020-05-21 PROCEDURE — 3700000001 HC ADD 15 MINUTES (ANESTHESIA): Performed by: INTERNAL MEDICINE

## 2020-05-21 PROCEDURE — 3609018800 HC ERCP DX COLLECTION SPECIMEN BRUSHING/WASHING: Performed by: INTERNAL MEDICINE

## 2020-05-21 PROCEDURE — 2580000003 HC RX 258: Performed by: INTERNAL MEDICINE

## 2020-05-21 PROCEDURE — 43276 ERCP STENT EXCHANGE W/DILATE: CPT | Performed by: INTERNAL MEDICINE

## 2020-05-21 PROCEDURE — 2500000003 HC RX 250 WO HCPCS

## 2020-05-21 PROCEDURE — 2580000003 HC RX 258

## 2020-05-21 PROCEDURE — 2709999900 HC NON-CHARGEABLE SUPPLY: Performed by: INTERNAL MEDICINE

## 2020-05-21 PROCEDURE — 7100000011 HC PHASE II RECOVERY - ADDTL 15 MIN: Performed by: INTERNAL MEDICINE

## 2020-05-21 PROCEDURE — 2500000003 HC RX 250 WO HCPCS: Performed by: NURSE ANESTHETIST, CERTIFIED REGISTERED

## 2020-05-21 PROCEDURE — 74300 X-RAY BILE DUCTS/PANCREAS: CPT

## 2020-05-21 PROCEDURE — 7100000001 HC PACU RECOVERY - ADDTL 15 MIN: Performed by: INTERNAL MEDICINE

## 2020-05-21 PROCEDURE — 6360000004 HC RX CONTRAST MEDICATION: Performed by: INTERNAL MEDICINE

## 2020-05-21 PROCEDURE — 3700000000 HC ANESTHESIA ATTENDED CARE: Performed by: INTERNAL MEDICINE

## 2020-05-21 RX ORDER — PROPOFOL 10 MG/ML
INJECTION, EMULSION INTRAVENOUS PRN
Status: DISCONTINUED | OUTPATIENT
Start: 2020-05-21 | End: 2020-05-21 | Stop reason: SDUPTHER

## 2020-05-21 RX ORDER — ROCURONIUM BROMIDE 10 MG/ML
INJECTION, SOLUTION INTRAVENOUS PRN
Status: DISCONTINUED | OUTPATIENT
Start: 2020-05-21 | End: 2020-05-21 | Stop reason: SDUPTHER

## 2020-05-21 RX ORDER — DEXAMETHASONE SODIUM PHOSPHATE 10 MG/ML
INJECTION INTRAMUSCULAR; INTRAVENOUS PRN
Status: DISCONTINUED | OUTPATIENT
Start: 2020-05-21 | End: 2020-05-21 | Stop reason: SDUPTHER

## 2020-05-21 RX ORDER — SODIUM CHLORIDE 9 MG/ML
INJECTION, SOLUTION INTRAVENOUS
Status: COMPLETED
Start: 2020-05-21 | End: 2020-05-21

## 2020-05-21 RX ORDER — SODIUM CHLORIDE 9 MG/ML
INJECTION, SOLUTION INTRAVENOUS CONTINUOUS
Status: DISCONTINUED | OUTPATIENT
Start: 2020-05-21 | End: 2020-05-21 | Stop reason: HOSPADM

## 2020-05-21 RX ORDER — PREDNISONE 20 MG/1
10 TABLET ORAL DAILY
COMMUNITY
End: 2020-08-04

## 2020-05-21 RX ORDER — GLYCOPYRROLATE 1 MG/5 ML
SYRINGE (ML) INTRAVENOUS PRN
Status: DISCONTINUED | OUTPATIENT
Start: 2020-05-21 | End: 2020-05-21 | Stop reason: SDUPTHER

## 2020-05-21 RX ORDER — LIDOCAINE HYDROCHLORIDE 20 MG/ML
INJECTION, SOLUTION INFILTRATION; PERINEURAL PRN
Status: DISCONTINUED | OUTPATIENT
Start: 2020-05-21 | End: 2020-05-21 | Stop reason: SDUPTHER

## 2020-05-21 RX ORDER — ONDANSETRON 2 MG/ML
INJECTION INTRAMUSCULAR; INTRAVENOUS PRN
Status: DISCONTINUED | OUTPATIENT
Start: 2020-05-21 | End: 2020-05-21 | Stop reason: SDUPTHER

## 2020-05-21 RX ORDER — CIPROFLOXACIN 750 MG/1
800 TABLET, FILM COATED ORAL DAILY
COMMUNITY
End: 2020-07-08 | Stop reason: ALTCHOICE

## 2020-05-21 RX ORDER — MAGNESIUM HYDROXIDE 1200 MG/15ML
LIQUID ORAL PRN
Status: DISCONTINUED | OUTPATIENT
Start: 2020-05-21 | End: 2020-05-21 | Stop reason: ALTCHOICE

## 2020-05-21 RX ADMIN — PHENYLEPHRINE HYDROCHLORIDE 50 MCG: 10 INJECTION INTRAVENOUS at 08:37

## 2020-05-21 RX ADMIN — PROPOFOL 150 MG: 10 INJECTION, EMULSION INTRAVENOUS at 08:04

## 2020-05-21 RX ADMIN — SODIUM CHLORIDE: 9 INJECTION, SOLUTION INTRAVENOUS at 07:39

## 2020-05-21 RX ADMIN — Medication 0.2 MG: at 08:18

## 2020-05-21 RX ADMIN — ONDANSETRON 4 MG: 2 INJECTION INTRAMUSCULAR; INTRAVENOUS at 08:11

## 2020-05-21 RX ADMIN — PHENYLEPHRINE HYDROCHLORIDE 100 MCG: 10 INJECTION INTRAVENOUS at 08:40

## 2020-05-21 RX ADMIN — DEXAMETHASONE SODIUM PHOSPHATE 10 MG: 10 INJECTION INTRAMUSCULAR; INTRAVENOUS at 08:11

## 2020-05-21 RX ADMIN — LIDOCAINE HYDROCHLORIDE 60 MG: 20 INJECTION, SOLUTION INFILTRATION; PERINEURAL at 08:04

## 2020-05-21 RX ADMIN — ROCURONIUM BROMIDE 40 MG: 10 SOLUTION INTRAVENOUS at 08:04

## 2020-05-21 RX ADMIN — SUGAMMADEX 200 MG: 100 INJECTION, SOLUTION INTRAVENOUS at 08:59

## 2020-05-21 ASSESSMENT — PULMONARY FUNCTION TESTS
PIF_VALUE: 16
PIF_VALUE: 16
PIF_VALUE: 17
PIF_VALUE: 16
PIF_VALUE: 3
PIF_VALUE: 1
PIF_VALUE: 17
PIF_VALUE: 13
PIF_VALUE: 1
PIF_VALUE: 18
PIF_VALUE: 16
PIF_VALUE: 22
PIF_VALUE: 2
PIF_VALUE: 16
PIF_VALUE: 18
PIF_VALUE: 18
PIF_VALUE: 13
PIF_VALUE: 16
PIF_VALUE: 16
PIF_VALUE: 17
PIF_VALUE: 16
PIF_VALUE: 14
PIF_VALUE: 16
PIF_VALUE: 2
PIF_VALUE: 19
PIF_VALUE: 16
PIF_VALUE: 17
PIF_VALUE: 19
PIF_VALUE: 17
PIF_VALUE: 11
PIF_VALUE: 1
PIF_VALUE: 0
PIF_VALUE: 13
PIF_VALUE: 17
PIF_VALUE: 17
PIF_VALUE: 12
PIF_VALUE: 11
PIF_VALUE: 17
PIF_VALUE: 7
PIF_VALUE: 17
PIF_VALUE: 18
PIF_VALUE: 16
PIF_VALUE: 17
PIF_VALUE: 17
PIF_VALUE: 1
PIF_VALUE: 16
PIF_VALUE: 17
PIF_VALUE: 16
PIF_VALUE: 17
PIF_VALUE: 16
PIF_VALUE: 16
PIF_VALUE: 15
PIF_VALUE: 17
PIF_VALUE: 17
PIF_VALUE: 2
PIF_VALUE: 1
PIF_VALUE: 17
PIF_VALUE: 16
PIF_VALUE: 16

## 2020-05-21 ASSESSMENT — PAIN - FUNCTIONAL ASSESSMENT: PAIN_FUNCTIONAL_ASSESSMENT: 0-10

## 2020-05-21 NOTE — ANESTHESIA PRE PROCEDURE
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    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 L57.8    Posterior vitreous detachment of both eyes H43.813    S/P knee replacement Z96.659    Seborrheic keratosis L82.1    Thyrotoxicosis without mention of goiter or other cause, without mention of thyrotoxic crisis or storm E05.90    Solitary pulmonary nodule R91.1    Obstructive jaundice due to malignant neoplasm (HCC) K83.1, C80.1       Past Medical History:        Diagnosis Date    Cancer [facility])     prostate and bladder     Colon cancer [facility])        Past Surgical History:        Procedure Laterality Date    COLONOSCOPY  01/23/2019    ccf    ENDOSCOPIC ULTRASOUND (LOWER) N/A 3/26/2020    EUS performed by Kenyetta Mcghee MD at [address] [place] [address] ERCP N/A 3/26/2020    EGD/ ERCP performed by Kenyetta Mcghee MD at [place] [address]        Social History:    Social History     Tobacco Use    Smoking status: Never Smoker    Smokeless tobacco: Never Used   Substance Use Topics    Alcohol use: Not Currently                                Counseling given: Not Answered      Vital Signs (Current): There were no vitals filed for this visit.                                            BP Readings from Last 3 Encounters:   03/27/20 117/73   03/26/20 121/77   03/24/20 (!) 151/71       NPO Status:                                                                                 BMI:

## 2020-05-21 NOTE — ANESTHESIA POSTPROCEDURE EVALUATION
Department of Anesthesiology  Postprocedure Note    Patient: Clifford Martinez  MRN: [id number]  YOB: 1935  Date of evaluation: 5/21/2020  Time:  9:07 AM     Procedure Summary     Date:  05/21/20 Room / Location:  [address] [place] [address]    Anesthesia Start:  0801 Anesthesia Stop:      Procedure:  ERCP WITH PLASTIC STENT REMOVAL AND PLACEMENT WITH METAL STENT [id number] IN COMMON BILE DUCT (N/A ) Diagnosis:  (MALIGNANT NEOPLASM OF HEAD OF PANCREAS  C25.0)    Surgeon:  Na Gant MD Responsible Provider:  SAMINA Griffith CRNA    Anesthesia Type:  general ASA Status:  3          Anesthesia Type: general    Leobardo Phase I: Leobardo Score: 10    Leobardo Phase II:      Last vitals: Reviewed and per EMR flowsheets.        Anesthesia Post Evaluation    Patient location during evaluation: PACU  Patient participation: complete - patient participated  Level of consciousness: awake and awake and alert  Pain score: 0  Airway patency: patent  Nausea & Vomiting: no nausea and no vomiting  Complications: no  Cardiovascular status: blood pressure returned to baseline and hemodynamically stable  Respiratory status: acceptable, face mask and spontaneous ventilation  Hydration status: euvolemic

## 2020-05-29 ENCOUNTER — TELEPHONE (OUTPATIENT)
Dept: GASTROENTEROLOGY | Age: 85
End: 2020-05-29

## 2020-06-30 ENCOUNTER — HOSPITAL ENCOUNTER (OUTPATIENT)
Dept: MRI IMAGING | Age: 85
Discharge: HOME | End: 2020-07-02
Payer: MEDICARE

## 2020-06-30 LAB
GFR AFRICAN AMERICAN: >60
GFR NON-AFRICAN AMERICAN: >60
PERFORMED ON: NORMAL
POC CREATININE: 0.8 MG/DL (ref 0.8–1.3)
POC SAMPLE TYPE: NORMAL

## 2020-06-30 PROCEDURE — 74183 MRI ABD W/O CNTR FLWD CNTR: CPT

## 2020-06-30 PROCEDURE — A9577 INJ MULTIHANCE: HCPCS | Performed by: INTERNAL MEDICINE

## 2020-06-30 PROCEDURE — 6360000004 HC RX CONTRAST MEDICATION: Performed by: INTERNAL MEDICINE

## 2020-06-30 RX ORDER — SODIUM CHLORIDE 0.9 % (FLUSH) 0.9 %
40 SYRINGE (ML) INJECTION 2 TIMES DAILY
Status: DISCONTINUED | OUTPATIENT
Start: 2020-06-30 | End: 2020-07-03 | Stop reason: HOSPADM

## 2020-06-30 RX ADMIN — GADOBENATE DIMEGLUMINE 20 ML: 529 INJECTION, SOLUTION INTRAVENOUS at 15:08

## 2020-07-08 ENCOUNTER — HOSPITAL ENCOUNTER (OUTPATIENT)
Dept: RADIATION ONCOLOGY | Age: 85
End: 2020-07-08
Attending: RADIOLOGY
Payer: MEDICARE

## 2020-07-08 ENCOUNTER — HOSPITAL ENCOUNTER (OUTPATIENT)
Dept: RADIATION ONCOLOGY | Age: 85
Discharge: HOME | End: 2020-07-08
Payer: MEDICARE

## 2020-07-08 VITALS
WEIGHT: 168.2 LBS | HEART RATE: 70 BPM | BODY MASS INDEX: 22.29 KG/M2 | HEIGHT: 73 IN | SYSTOLIC BLOOD PRESSURE: 134 MMHG | TEMPERATURE: 97 F | RESPIRATION RATE: 16 BRPM | DIASTOLIC BLOOD PRESSURE: 71 MMHG

## 2020-07-08 PROCEDURE — 99212 OFFICE O/P EST SF 10 MIN: CPT | Performed by: RADIOLOGY

## 2020-07-08 PROCEDURE — 77334 RADIATION TREATMENT AID(S): CPT | Performed by: RADIOLOGY

## 2020-07-08 PROCEDURE — 77290 THER RAD SIMULAJ FIELD CPLX: CPT | Performed by: RADIOLOGY

## 2020-07-08 RX ORDER — L.RHAMNOSUS/B.ANIMALIS(LACTIS) 3B CELL
1 CAPSULE ORAL DAILY
COMMUNITY

## 2020-07-08 RX ORDER — POLYETHYLENE GLYCOL 3350 17 G/17G
17 POWDER, FOR SOLUTION ORAL DAILY
COMMUNITY

## 2020-07-08 NOTE — H&P
RADIATION NEW PATIENT EVALUATION  DATE OF VISIT: 2020    DIAGNOSIS & STAGING: [id number] 3 cm adenoca of the head of pancreas mass with potential invasion of duodenal wall without vascular encasement. EUS node negative, no mets    Dear Dr Rudolm Apgar;     CURRENT COMPLAINT: Here today for radiation recommendations    HPI: I was asked by Dr. Rudolm Apgar to see this 81 y.o. male who originally presented with a 2 month history of early satiety. He was evaluated and was found to be jaundiced and was admitted through ER with bilirubin of 14. Imaging showed a 3.0 cm head of pancreas mass without evidence of metastatic disease. ERCP and stent placement confirmed adenocarcinoma. He started Gemzar and Abraxane 2020  With last cycle end of May. Stent was replaced 2020. CA dropped to  199  from 1093 and he Saw Dr Patricia Boston 2020 who discussed Whipple surgery. The patient wanted to  Pursue a minimally invasive procedure which is not offered so a neoadjuvant radiation approach was discussed.  Repeat imaging shows relatively Stable disease, small liver lesions reviewed and are indeterminant:  MRI ABDOMEN W WO CONTRAST : 2020       CLINICAL HISTORY: C25.0 Malignant neoplasm of head of pancreas (HCC) ICD10.       COMPARISON: Abdomen MRI 3/25/2020 and fluoroscopic-guided biliary stent placement image 2020.       TECHNIQUE: Multiplanar MR imaging of the abdomen was performed before and after intravenous administration of approximately 20 mL of MultiHance gadolinium contrast.       MRCP sequences were performed with routine and volume rendered images obtained on a 3-dimensional workstation.           FINDINGS:        An approximately 3 cm pancreatic head mass appears unchanged, with marked parenchymal atrophy and varicose dilatation of the pancreatic duct elsewhere within the pancreas.       A few small scattered nodules predominantly within the dome of the liver are suspicious for metastatic disease, the largest within the superior anterior segment of the right lobe measuring approximately 1 cm.       Longitudinal follow-up is suggested with CT with contrast (if possible), rather than MRI.       A stent within the biliary duct extends into the duodenum and precludes evaluation for choledocholithiasis.       The gallbladder is not distended, containing numerous small gallstones dependently and a moderate amount of gas nondependently.       Biliary dilatation appears significantly improved from the previous MRI, with mild residual intrahepatic biliary dilatation and pneumobilia noted.       There is no significant lymphadenopathy, surrounding inflammation, or ascites.                   Impression       SUSPECT SMALL HEPATIC METASTATIC LESIONS, PREDOMINANTLY WITHIN THE SUPERIOR LIVER, AS NOTED.       LONGITUDINAL FOLLOW-UP IS SUGGESTED WITH CT WITH CONTRAST (IF POSSIBLE), RATHER THAN MRI.       APPARENTLY STABLE 3 CM PANCREATIC HEAD MALIGNANCY.       BILIARY STENT WITH SIGNIFICANTLY IMPROVED BILIARY DILATATION AND PNEUMOBILIA.       CHOLELITHIASIS.              He is here today for radiation recommendations.      PAST MEDICAL HISTORY:   Past Medical History:   Diagnosis Date    Bladder cancer ([facility] [address] 02/1970    per patient    Cancer [facility])     prostate and bladder     Colon cancer [facility])        PAST SURGICAL HISTORY:  Past Surgical History:   Procedure Laterality Date    COLONOSCOPY  01/23/2019    ccf    ENDOSCOPIC ULTRASOUND (LOWER) N/A 3/26/2020    EUS performed by Janna Bird MD at [address] [place] [address] ERCP N/A 3/26/2020    EGD/ ERCP performed by Janna Bird MD at [address] [place] [address] ERCP N/A 5/21/2020    ERCP WITH PLASTIC STENT REMOVAL AND PLACEMENT of METAL STENT 57TUD53 mm BILE DUCT performed by Janna Bird MD at [address] [place] [address]      x2     JOINT REPLACEMENT Bilateral     PROSTATE SURGERY          ALLERGIES:   Allergies   Allergen Reactions    Menthol (Topical Analgesic)      Skin reaction \"bubble up\"    Zolpidem      Other reaction(s): Hallucinations    Amben [Aminobenzoate] Rash       CURRENT MEDICATIONS:   Prior to Admission medications    Medication Sig Start Date End Date Taking? Authorizing Provider   polyethylene glycol (GLYCOLAX) 17 GM/SCOOP powder Take 17 g by mouth daily   Yes Historical Provider, MD   Probiotic Product ([address]) CAPS Take 1 tablet by mouth daily   Yes Historical Provider, MD   predniSONE (DELTASONE) 20 MG tablet Take 10 mg by mouth daily   Yes Historical Provider, MD   Multiple Vitamins-Minerals (PRESERVISION AREDS 2+MULTI VIT) CAPS Take by mouth 8/18/14  Yes Historical Provider, MD   Vitamin D, Ergocalciferol, 50 MCG (2000 UT) CAPS Take 1 tablet by mouth daily   Yes Historical Provider, MD   pantoprazole (PROTONIX) 40 MG tablet Take 60 mg by mouth daily  3/13/20  Yes Historical Provider, MD   aspirin 81 MG chewable tablet Take 81 mg by mouth daily    Historical Provider, MD   lipase-protease-amylase (CREON) 35003 units CPEP delayed release capsule Take 2 capsules during meals Take 1 capsule during snacks 5/15/20   Magdaleno Padilla MD   Naproxen Sodium (ALEVE) 220 MG CAPS Take 1 capsule by mouth as needed for Pain    Historical Provider, MD       I have personally reviewed and reconciled the medications listed. FAMILY HISTORY:   Family History   Problem Relation Age of Onset    Colon Cancer Mother     Breast Cancer Mother     Coronary Art Dis Father     Arthritis Sister     Celiac Disease Neg Hx     Crohn's Disease Neg Hx        SOCIAL HISTORY:   Social History     Tobacco Use   Smoking Status Never Smoker   Smokeless Tobacco Never Used     Social History     Substance and Sexual Activity   Alcohol Use Not Currently       Review Of Systems:   Pain Score:   Pain Score (1-10): none   Pain Location:    Pain Duration:    Pain Management/Control:       Is pain affecting your ability to take care of yourself or move throughout your home?                         []? S3 or S4, and no murmur noted    LUNGS:  No increased work of breathing, good air exchange, clear to auscultation bilaterally, no crackles or wheezing    ABDOMEN:  Nontender, nondistended, normal bowel sounds, soft, no masses, no hepatosplenomegally    EXTREMITIES: No cyanosis, clubbing or edema    MUSCULOSKELETAL: No bony tenderness along the hips ribs or spine. Motor strength is 5 out of 5 all extremities bilaterally. Tone is normal.    NEUROLOGIC:  Awake, alert, oriented x 3. Nonfocal    SKIN:  Pale, no bruising or bleeding, normal skin color, texture, turgor, no redness, warmth, or swelling, no rashes, no lesions, no abnormal moles and no jaundice      STUDIES: I have personally reviewed the imaging, laboratory, and pathology studies as previously described. IMPRESSION/PLAN:    Borderline resectable pancreatic cancer s/p chemotherapy with 3 cm mass on current MRI, indeterminant liver lesions. Radiation therapy 36Gy 15 fx, then reevaluation for surgery. He sees Dr Susannah Soulier 7/21. Dr Susannah Soulier to order imaging after completion and folliowup on liver lesions/systemic recommendations. Intent of treatment, potential risks benefits and side effects reviewed today. Thank you for this consult and allowing us to participate in the care of this patient.   Sincerely,    Electronically signed by Anabela Gauthier MD on 7/8/20 at 1:37 PM EDT      cc:   No att. providers found  MD Chandrakant Dosss, [address]

## 2020-07-08 NOTE — CONSULTS
NURSING ASSESSMENT     Date: 7/8/2020        Patient Name: Enrrique Lester     YOB: 1935      Age:  81 y.o. MRN: [id number]     Chaperone [] Yes   [x] No      Advance Directives:  Do you currently have completed advance directives (living will)? [x] Yes   [] No         *If yes, please bring us a copy for your records. *If no, would you like info or assistance in completing advance directives (living will)? [] Yes   [x] No    Pain Score:   Pain Score (1-10): none   Pain Location:    Pain Duration:    Pain Management/Control:       Is pain affecting your ability to take care of yourself or move throughout your home? [] Yes   [x] No    General: Anorexia, Weight Loss, Weakness and Fatigue, fell yesterday  Patient has gained weight [] Yes   [x] No  Patient has lost weight [x] Yes   [] No  How much weight in pounds and over what length of time: 15lb over past couple months  Eyes (Ophthalmic): No Problem     Skin (Dermatological): pruritis all over     ENT: Difficulty Swallowing/Chewing on occasion     Respiratory: Cough and SOB     Cardiovascular: No Problems      Device   [] Yes   [x] No   Copy of Card Obtained [] Yes   [x] No    Gastrointestinal: Nausea on occasion, appetite is poor, takes ensure occasionally, bowels are moving with miralax, last BM today    Genito-Urinary: has history of prostate and bladder cancer with prostate surgery, chemo treatments into bladder, has incontinence and wears depends     Breast: No Problems     Musculoskeletal: Joint Pain , right shoulder, missing left first finger    Neurological: Numbness lower legs      Hematological and Lymphatic: No Problems     Endocrine: No Problems     A 10-point review of systems has been conducted and pertinent positives have been   recorded.  All other review of systems are negative    Was the patient admitted during the course of treatment OR within 30 days of treatment? n/a    Additional Comments: started gemzar and abraxane 4/2020 with Dr Puma Perez, last treatment 3 weeks ago

## 2020-07-09 PROCEDURE — 77293 RESPIRATOR MOTION MGMT SIMUL: CPT | Performed by: RADIOLOGY

## 2020-07-09 PROCEDURE — 77338 DESIGN MLC DEVICE FOR IMRT: CPT | Performed by: RADIOLOGY

## 2020-07-09 PROCEDURE — 77300 RADIATION THERAPY DOSE PLAN: CPT | Performed by: RADIOLOGY

## 2020-07-09 PROCEDURE — 77301 RADIOTHERAPY DOSE PLAN IMRT: CPT | Performed by: RADIOLOGY

## 2020-07-13 ENCOUNTER — TELEPHONE (OUTPATIENT)
Dept: GASTROENTEROLOGY | Age: 85
End: 2020-07-13

## 2020-07-13 NOTE — TELEPHONE ENCOUNTER
The patient wife Margo Ryan is requesting the Creon sent to [facility], since its cheaper. The patient did not want to start it back in May when Dr. Kevin Phoenix prescribed it. He has a lack of appetite and feels week now. The patient is starting radiation on 7/17/20 with Dr. Lyudmila Head at the [facility]. She wants to know if her  would need to wait to see [facility] or Dr. Kevin Phoenix before starting the Creon. Please advise at [phone].

## 2020-07-14 NOTE — TELEPHONE ENCOUNTER
Patient had a couple questions regarding length of time. Patient wife did [facility] was sending [facility] to [facility]. Holli Lozano recommended we give samples until that gets situated. Did not need a VV.   Did fax over the creon form to see if there is any financial help for the patient

## 2020-07-16 ENCOUNTER — APPOINTMENT (OUTPATIENT)
Dept: RADIATION ONCOLOGY | Age: 85
End: 2020-07-16
Attending: RADIOLOGY
Payer: MEDICARE

## 2020-07-16 PROCEDURE — 77334 RADIATION TREATMENT AID(S): CPT | Performed by: RADIOLOGY

## 2020-07-16 PROCEDURE — 77300 RADIATION THERAPY DOSE PLAN: CPT | Performed by: RADIOLOGY

## 2020-07-16 PROCEDURE — 77295 3-D RADIOTHERAPY PLAN: CPT | Performed by: RADIOLOGY

## 2020-07-17 ENCOUNTER — HOSPITAL ENCOUNTER (OUTPATIENT)
Dept: RADIATION ONCOLOGY | Age: 85
End: 2020-07-17
Attending: RADIOLOGY
Payer: MEDICARE

## 2020-07-17 ENCOUNTER — APPOINTMENT (OUTPATIENT)
Dept: RADIATION ONCOLOGY | Age: 85
End: 2020-07-17
Attending: RADIOLOGY
Payer: MEDICARE

## 2020-07-20 ENCOUNTER — APPOINTMENT (OUTPATIENT)
Dept: RADIATION ONCOLOGY | Age: 85
End: 2020-07-20
Attending: RADIOLOGY
Payer: MEDICARE

## 2020-07-20 ENCOUNTER — HOSPITAL ENCOUNTER (OUTPATIENT)
Dept: RADIATION ONCOLOGY | Age: 85
Discharge: HOME | End: 2020-07-20
Attending: RADIOLOGY
Payer: MEDICARE

## 2020-07-20 PROCEDURE — 77290 THER RAD SIMULAJ FIELD CPLX: CPT | Performed by: RADIOLOGY

## 2020-07-20 PROCEDURE — 77412 RADIATION TX DELIVERY LVL 3: CPT | Performed by: RADIOLOGY

## 2020-07-20 PROCEDURE — 77280 THER RAD SIMULAJ FIELD SMPL: CPT | Performed by: RADIOLOGY

## 2020-07-20 PROCEDURE — 77387 GUIDANCE FOR RADJ TX DLVR: CPT | Performed by: RADIOLOGY

## 2020-07-20 PROCEDURE — 99214 OFFICE O/P EST MOD 30 MIN: CPT | Performed by: RADIOLOGY

## 2020-07-20 NOTE — PROGRESS NOTES
Marina Bell  7/20/2020  Wt Readings from Last 10 Encounters:   07/08/20 168 lb 3.2 oz (76.3 kg)   05/21/20 170 lb (77.1 kg)   03/25/20 173 lb 15.1 oz (78.9 kg)   03/24/20 168 lb (76.2 kg)   01/21/20 174 lb (78.9 kg)   12/20/19 178 lb (80.7 kg)   04/03/19 179 lb (81.2 kg)     Ht Readings from Last 1 Encounters:   07/08/20 6' 1\" (1.854 m)       Wt  167. 6# today    Weight change: stable x 3 wks,  since started on Creon per pt   Appetite: improving since chemo, knows he needs to eat  N/V/D/C: constipation however had small BM today, denies n/v/d. Pt states taste is improving since chemo. Started on Creon taking 2 tablets with meals and 1 with snacks. Pt eating two meals per day and two snacks. Answered questions of pt and wife. Explained importance of balanced diet and adjusting diet as needed for symptom management. Pt taking 64 oz water/gatorade per day. Recommendations:  Five small meals per day, continue with fluid intake and recommend and instructed on ways increase fiber at this time due to constipation.      Goal:  Stable wts, regular BMs    ASPEN GUIDELINES FOR CLINICAL CHARACTERISTICS OF MALNUTRITION IN CHRONIC ILLNESS     Moderate Malnutrition  Severe Malnutrition    Energy intake  <75% energy intake compared to estimated needs for >1month <75% energy intake compared to estimated needs for >1month   Weight changes  5% x 1 month  7.5% x 3 months   10% x 6 months   20% x 1 year  >5% x 1 month  >7.5% x 3 months   >10% x 6 months   >20% x 1 year    Physical findings  Mild   Decrease subcutaneous fat    Decrease muscle mass     Increase fluid/edema   Severe  Decrease subcutaneous fat    Decrease muscle mass     Increase fluid/edema        Leonce Austyn

## 2020-07-20 NOTE — PROGRESS NOTES
Teaching & Instructions - ABDOMEN  General  Simulation  Initial Treatment  Self-Care Info  Nutrition  Social Service    Site-Specific  Side Effects  Diminished Apetite  Nausea  Vomiting  Fatigue Mgmt  Loose and / or more frequent stools  Abdominal Cramping with or without gas  Smoking Cessation    Educational Handouts  Radiation Therapy & You  Site Specific Instructions  Radiation Oncology Dept Information  CBMS Program    Patient eager to learn, verbalized understanding of verbal education and handouts.

## 2020-07-21 ENCOUNTER — HOSPITAL ENCOUNTER (OUTPATIENT)
Dept: RADIATION ONCOLOGY | Age: 85
Discharge: HOME | End: 2020-07-21
Attending: RADIOLOGY
Payer: MEDICARE

## 2020-07-21 PROCEDURE — 77412 RADIATION TX DELIVERY LVL 3: CPT | Performed by: RADIOLOGY

## 2020-07-21 PROCEDURE — 77387 GUIDANCE FOR RADJ TX DLVR: CPT | Performed by: RADIOLOGY

## 2020-07-22 ENCOUNTER — APPOINTMENT (OUTPATIENT)
Dept: RADIATION ONCOLOGY | Age: 85
End: 2020-07-22
Attending: RADIOLOGY
Payer: MEDICARE

## 2020-07-22 PROCEDURE — 77387 GUIDANCE FOR RADJ TX DLVR: CPT | Performed by: RADIOLOGY

## 2020-07-22 PROCEDURE — 77412 RADIATION TX DELIVERY LVL 3: CPT | Performed by: RADIOLOGY

## 2020-07-23 ENCOUNTER — APPOINTMENT (OUTPATIENT)
Dept: RADIATION ONCOLOGY | Age: 85
End: 2020-07-23
Attending: RADIOLOGY
Payer: MEDICARE

## 2020-07-23 PROCEDURE — 77387 GUIDANCE FOR RADJ TX DLVR: CPT | Performed by: RADIOLOGY

## 2020-07-23 PROCEDURE — 77412 RADIATION TX DELIVERY LVL 3: CPT | Performed by: RADIOLOGY

## 2020-07-24 ENCOUNTER — HOSPITAL ENCOUNTER (OUTPATIENT)
Dept: RADIATION ONCOLOGY | Age: 85
Discharge: HOME | End: 2020-07-24
Attending: RADIOLOGY
Payer: MEDICARE

## 2020-07-24 PROCEDURE — 77412 RADIATION TX DELIVERY LVL 3: CPT | Performed by: RADIOLOGY

## 2020-07-24 PROCEDURE — 77336 RADIATION PHYSICS CONSULT: CPT | Performed by: RADIOLOGY

## 2020-07-24 PROCEDURE — 77387 GUIDANCE FOR RADJ TX DLVR: CPT | Performed by: RADIOLOGY

## 2020-07-27 ENCOUNTER — HOSPITAL ENCOUNTER (OUTPATIENT)
Dept: RADIATION ONCOLOGY | Age: 85
Discharge: HOME | End: 2020-07-27
Attending: RADIOLOGY
Payer: MEDICARE

## 2020-07-27 PROCEDURE — 77387 GUIDANCE FOR RADJ TX DLVR: CPT | Performed by: RADIOLOGY

## 2020-07-27 PROCEDURE — 77412 RADIATION TX DELIVERY LVL 3: CPT | Performed by: RADIOLOGY

## 2020-07-27 PROCEDURE — 77417 THER RADIOLOGY PORT IMAGE(S): CPT | Performed by: RADIOLOGY

## 2020-07-28 ENCOUNTER — HOSPITAL ENCOUNTER (OUTPATIENT)
Dept: RADIATION ONCOLOGY | Age: 85
Discharge: HOME | End: 2020-07-28
Attending: RADIOLOGY
Payer: MEDICARE

## 2020-07-28 PROCEDURE — 77412 RADIATION TX DELIVERY LVL 3: CPT | Performed by: RADIOLOGY

## 2020-07-28 PROCEDURE — 77387 GUIDANCE FOR RADJ TX DLVR: CPT | Performed by: RADIOLOGY

## 2020-07-28 PROCEDURE — 99213 OFFICE O/P EST LOW 20 MIN: CPT | Performed by: RADIOLOGY

## 2020-07-28 NOTE — PROGRESS NOTES
Augusto Alva  7/28/2020  Wt Readings from Last 10 Encounters:   07/08/20 168 lb 3.2 oz (76.3 kg)   05/21/20 170 lb (77.1 kg)   03/25/20 173 lb 15.1 oz (78.9 kg)   03/24/20 168 lb (76.2 kg)   01/21/20 174 lb (78.9 kg)   12/20/19 178 lb (80.7 kg)   04/03/19 179 lb (81.2 kg)     Ht Readings from Last 1 Encounters:   07/08/20 6' 1\" (1.854 m)     Wt:  163# today. Wt last wk was 167.6#  Weight change:4.6# wt loss x 1 wk  Appetite: poor  N/V/D/C: Pt was constipated so he stopped his Creon, now floating, light colored stools    Pt reports not taking his Creon for 4-5 days due to constipation with the Creon. Was taking 2 Creon tablets per meal and one per snack. Explained to pt purpose and importance of Creon. Pt encouraged to start each meal with one tablet and if eating a bigger meal then take a second tablet half way through his meal.  Due to poor appetite intake with meals are more similar to snacks. Dr Krista Harris discussed with pt meds for constipation.       Recommendations:  Resume Creon, 5 small meals per day  Contact RD with any questions or concerns    Goal: stable wts, daily BMs      ASPEN GUIDELINES FOR CLINICAL CHARACTERISTICS OF MALNUTRITION IN CHRONIC ILLNESS     Moderate Malnutrition  Severe Malnutrition    Energy intake  <75% energy intake compared to estimated needs for >1month <75% energy intake compared to estimated needs for >1month   Weight changes  5% x 1 month  7.5% x 3 months   10% x 6 months   20% x 1 year  >5% x 1 month  >7.5% x 3 months   >10% x 6 months   >20% x 1 year    Physical findings  Mild   Decrease subcutaneous fat    Decrease muscle mass     Increase fluid/edema   Severe  Decrease subcutaneous fat    Decrease muscle mass     Increase fluid/edema        [place]

## 2020-07-28 NOTE — PROGRESS NOTES
Tasha Guajardo   [id number]  1935   Patient Mid-Point Distress Screening Form   Please select the number that describes how much distress you have experienced in the past week (0-10): 0    Please select the number that describes how much distress you have experienced today (0-10): 0    If you responded with a score of 6 or above to the first tow questions; please address the following concerns:    Practical Concerns 0-10 Comment        Work, Concerns about Job          Finances, Bills, Insurance          Housing          Transportation          Availability of Caregiver     Family Concerns          Dealing with Children          Dealing with Partner          Ability to have [place] [address] Issues     Emotional Concerns          Sadness, Depression          Anxiety, Worry, Fear          Concerns about Appearance          Loss of Interest in Usual Activities     Spiritual Concerns          Connection to a higher power          Sense of meaning and purpose          Support for my spiritual community     Physical Concerns          Nausea          Eating, Loss of Appetite          Pain          Fatigue       Other Concerns/Notes:  Met with pt and his wife to complete Midpoint Assessment, and also to follow up with referral by Dr. Kimo Newton, who felt that couple may wish support in the midst of difficult cancer treatment with looming decisions. On the whole, however, pt reported that while he struggles with occasional thoughts of distress, \"I do not walk in distress. I live in moments. \"  He stated that with his history of previous cancers, he has chosen to rely more strongly on his eli in his decision making.   He is aware that he may or may not receive desired medical outcomes, and he is able to discuss a notion of peace with his decisions (not to have surgery at this point), and his wife nods her head in support as he states this, despite the fact that she reflects pain in her countenance she seems supportive. They seem aware and understanding of implications of treatment, and they remain hopeful and realistic. The couple reports that they live at home together with a dog, which provides much enjoyment. They each describe a strong eli and a very supportive [employer] community, from whom they receive much support. Couple expressed appreciation for meeting SW.  SW contact information was provided, and they were invited to use support as they feel need. Date of visit: 7/28/2020 12:07 PM      : Glynn Calhoun.  GEMMA Holly

## 2020-07-29 ENCOUNTER — HOSPITAL ENCOUNTER (OUTPATIENT)
Dept: RADIATION ONCOLOGY | Age: 85
Discharge: HOME | End: 2020-07-29
Attending: RADIOLOGY
Payer: MEDICARE

## 2020-07-29 PROCEDURE — 77387 GUIDANCE FOR RADJ TX DLVR: CPT | Performed by: RADIOLOGY

## 2020-07-29 PROCEDURE — 77412 RADIATION TX DELIVERY LVL 3: CPT | Performed by: RADIOLOGY

## 2020-07-30 ENCOUNTER — HOSPITAL ENCOUNTER (OUTPATIENT)
Dept: RADIATION ONCOLOGY | Age: 85
Discharge: HOME | End: 2020-07-30
Attending: RADIOLOGY
Payer: MEDICARE

## 2020-07-30 PROCEDURE — 77412 RADIATION TX DELIVERY LVL 3: CPT | Performed by: RADIOLOGY

## 2020-07-30 PROCEDURE — 77387 GUIDANCE FOR RADJ TX DLVR: CPT | Performed by: RADIOLOGY

## 2020-07-31 ENCOUNTER — APPOINTMENT (OUTPATIENT)
Dept: RADIATION ONCOLOGY | Age: 85
End: 2020-07-31
Attending: RADIOLOGY
Payer: MEDICARE

## 2020-07-31 PROCEDURE — 77387 GUIDANCE FOR RADJ TX DLVR: CPT | Performed by: RADIOLOGY

## 2020-07-31 PROCEDURE — 77412 RADIATION TX DELIVERY LVL 3: CPT | Performed by: RADIOLOGY

## 2020-07-31 PROCEDURE — 77336 RADIATION PHYSICS CONSULT: CPT | Performed by: RADIOLOGY

## 2020-08-03 ENCOUNTER — HOSPITAL ENCOUNTER (OUTPATIENT)
Dept: RADIATION ONCOLOGY | Age: 85
Discharge: HOME | End: 2020-08-03
Attending: RADIOLOGY
Payer: MEDICARE

## 2020-08-03 PROCEDURE — 77412 RADIATION TX DELIVERY LVL 3: CPT | Performed by: RADIOLOGY

## 2020-08-03 PROCEDURE — 77387 GUIDANCE FOR RADJ TX DLVR: CPT | Performed by: RADIOLOGY

## 2020-08-03 NOTE — ONCOLOGY
Radiation Oncology Completion Letter    Patient Name:   Cari Oquendo  Medical Record #: [id number]  Date of Birth: [birth year]  Diagnosis: 3 cm head of pancreas mass with direct invasion of duodenum without vascular encasement. EUS node negative, no mets  Treatment Dates:  July 20- Aug  7, 2020    Site: Dose: Total # fractions: Dose per fraction: Energy: Technique   Pancreatic head mass 36 Gy 15 2.4 Gy 10 MV photons 4 field     Concurrent therapy:  gemcitabine    Technique:   VMAT IGRT was denied by insurance. 3D CRT with image guidance was performed    Treatment course:  Mr. Jaycob Razo tolerated radiation treatment well with the expected toxicity. Patient was instructed to return to our clinic in approximately 1-2 weeks post radiation treatment for nutrition and supportive care. He will need reimaging to be managed by Dr Cesilia Restrepo who will also be following [place] 19-9. Discussed strong consideration of surgery to follow in the absence of metastatic disease.

## 2020-08-04 ENCOUNTER — HOSPITAL ENCOUNTER (OUTPATIENT)
Dept: RADIATION ONCOLOGY | Age: 85
Discharge: HOME | End: 2020-08-04
Attending: RADIOLOGY
Payer: MEDICARE

## 2020-08-04 PROCEDURE — 77412 RADIATION TX DELIVERY LVL 3: CPT | Performed by: RADIOLOGY

## 2020-08-04 PROCEDURE — 77387 GUIDANCE FOR RADJ TX DLVR: CPT | Performed by: RADIOLOGY

## 2020-08-04 PROCEDURE — 99212 OFFICE O/P EST SF 10 MIN: CPT | Performed by: RADIOLOGY

## 2020-08-04 RX ORDER — SENNA PLUS 8.6 MG/1
1 TABLET ORAL DAILY
COMMUNITY

## 2020-08-04 RX ORDER — DOCUSATE SODIUM 100 MG/1
100 CAPSULE, LIQUID FILLED ORAL 2 TIMES DAILY
COMMUNITY

## 2020-08-05 ENCOUNTER — HOSPITAL ENCOUNTER (OUTPATIENT)
Dept: RADIATION ONCOLOGY | Age: 85
Discharge: HOME | End: 2020-08-05
Attending: RADIOLOGY
Payer: MEDICARE

## 2020-08-05 PROCEDURE — 77412 RADIATION TX DELIVERY LVL 3: CPT | Performed by: RADIOLOGY

## 2020-08-05 PROCEDURE — 77387 GUIDANCE FOR RADJ TX DLVR: CPT | Performed by: RADIOLOGY

## 2020-08-06 ENCOUNTER — APPOINTMENT (OUTPATIENT)
Dept: RADIATION ONCOLOGY | Age: 85
End: 2020-08-06
Attending: RADIOLOGY
Payer: MEDICARE

## 2020-08-06 PROCEDURE — 77412 RADIATION TX DELIVERY LVL 3: CPT | Performed by: RADIOLOGY

## 2020-08-06 PROCEDURE — 77387 GUIDANCE FOR RADJ TX DLVR: CPT | Performed by: RADIOLOGY

## 2020-08-07 ENCOUNTER — APPOINTMENT (OUTPATIENT)
Dept: RADIATION ONCOLOGY | Age: 85
End: 2020-08-07
Attending: RADIOLOGY
Payer: MEDICARE

## 2020-08-07 PROCEDURE — 77387 GUIDANCE FOR RADJ TX DLVR: CPT | Performed by: RADIOLOGY

## 2020-08-07 PROCEDURE — 77336 RADIATION PHYSICS CONSULT: CPT | Performed by: RADIOLOGY

## 2020-08-07 PROCEDURE — 77412 RADIATION TX DELIVERY LVL 3: CPT | Performed by: RADIOLOGY

## 2020-08-14 ENCOUNTER — VIRTUAL VISIT (OUTPATIENT)
Dept: GASTROENTEROLOGY | Age: 85
End: 2020-08-14
Payer: MEDICARE

## 2020-08-14 PROCEDURE — 99443 PR PHYS/QHP TELEPHONE EVALUATION 21-30 MIN: CPT | Performed by: INTERNAL MEDICINE

## 2020-08-14 RX ORDER — DRONABINOL 2.5 MG/1
CAPSULE ORAL
COMMUNITY
Start: 2020-08-12

## 2020-08-14 RX ORDER — POLYETHYLENE GLYCOL 3350 17 G/17G
POWDER, FOR SOLUTION ORAL
COMMUNITY
End: 2020-08-17

## 2020-08-14 RX ORDER — SIMETHICONE 125 MG
TABLET,CHEWABLE ORAL
COMMUNITY
Start: 2020-08-12

## 2020-08-14 RX ORDER — DOCUSATE SODIUM 100 MG/1
100 CAPSULE, LIQUID FILLED ORAL
COMMUNITY
Start: 2020-01-21 | End: 2020-08-17

## 2020-08-14 RX ORDER — ONDANSETRON 4 MG/1
TABLET, ORALLY DISINTEGRATING ORAL
COMMUNITY
Start: 2020-08-12

## 2020-08-14 NOTE — PROGRESS NOTES
2020    TELEHEALTH EVALUATION -- Audio/Visual (During HJTOE-73 public health emergency)    Due to [place] 19 outbreak, patient's office visit was converted to a virtual visit. Patient was contacted and agreed to proceed with a virtual visit via Telephone Visit  The risks and benefits of converting to a virtual visit were discussed in light of the current infectious disease epidemic. Patient also understood that insurance coverage and co-pays are up to their individual insurance plans. Chief Complaint   Patient presents with    Other     poor appetite     HPI:  Patient Location: Home  Provider location: Office    Robbie Canseco (:  1935) has requested an audio/video evaluation for the following concern(s): Reduced appetite, weight loss. Patient with known history of pancreatic adenocarcinoma diagnosed in 2020. Patient status post ERCP with metal stent placement on May 21, 2020. Patient doing well from drainage perspective. Patient status post chemotherapy and radiation therapy. Patient saw  Dr. Mariela Taveras at [facility] with [facility] and was considered to be resectable after neoadjuvant chemotherapy and radiation therapy. His main complaint today is of loss of appetite, reduced oral intake and weight loss. Patient is not taking the Creon as he had side effects in the form of constipation when he tried it. He just started his Marinol therapy today. Review of Systems   All other systems reviewed and are negative. Prior to Visit Medications    Medication Sig Taking?  Authorizing Provider   docusate sodium (COLACE) 100 MG capsule Take 100 mg by mouth Yes Historical Provider, MD   polyethylene glycol (GLYCOLAX) 17 g packet Take by mouth Yes Historical Provider, MD   dronabinol (MARINOL) 2.5 MG capsule TAKE 1 CAPSULE BY MOUTH TWICE DAILY Yes Historical Provider, MD   ondansetron (ZOFRAN-ODT) 4 MG disintegrating tablet TAKE 1 TABLET BY MOUTH EVERY 4 HOURS FOR

## 2020-08-17 ENCOUNTER — HOSPITAL ENCOUNTER (OUTPATIENT)
Dept: RADIATION ONCOLOGY | Age: 85
Discharge: HOME | End: 2020-08-17
Payer: MEDICARE

## 2020-08-17 ENCOUNTER — HOSPITAL ENCOUNTER (OUTPATIENT)
Dept: RADIATION ONCOLOGY | Age: 85
Discharge: HOME | End: 2020-08-17
Attending: RADIOLOGY
Payer: MEDICARE

## 2020-08-17 VITALS
RESPIRATION RATE: 18 BRPM | TEMPERATURE: 98 F | BODY MASS INDEX: 20.9 KG/M2 | SYSTOLIC BLOOD PRESSURE: 122 MMHG | HEART RATE: 74 BPM | WEIGHT: 158.4 LBS | OXYGEN SATURATION: 98 % | DIASTOLIC BLOOD PRESSURE: 68 MMHG

## 2020-08-17 PROCEDURE — 99212 OFFICE O/P EST SF 10 MIN: CPT | Performed by: RADIOLOGY

## 2020-08-17 NOTE — PROGRESS NOTES
NURSING ASSESSMENT     Date: 8/17/2020        Patient Name: Julieta Juares     YOB: 1935      Age:  81 y.o. MRN: [id number]     Chaperone [] Yes   [x] No      Pain Score:   Pain Score (1-10): \"not pain, just discomfort\"  Pain Location: left side of abdomen and across lower abdomen   Pain Duration: all day  Pain Management/Control: sitting still      Is pain affecting your ability to take care of yourself or move throughout your home? [] Yes   [x] No    General: Anorexia, Weight Loss, Weakness and Fatigue  Patient has gained weight [] Yes   [] No  Patient has lost weight [x] Yes   [] No  How much weight in pounds and over what length of time: 2.4lb over 2 weeks  Eyes (Ophthalmic): No Problem     Skin (Dermatological): No Problems, itching skin has resolved     ENT: sense of smell and taste is \"too good\"     Respiratory: when breathes deep has a pain upper left side of back     Cardiovascular: No Problems     Gastrointestinal: Abdominal Pain and Nausea, takes zofran and marinol, tries to get 64 oz of fluid in a day, has a poor appetite    Genito-Urinary: has stress incontinence     Breast: No Problems     Musculoskeletal: Joint Pain and Back Pain    Neurological: Numbness, Tingling, Dizziness and Vertigo      Hematological and Lymphatic: No Problems     Endocrine: No Problems     A 10-point review of systems has been conducted and pertinent positives have been   recorded. All other review of systems are negative    Was the patient admitted during the course of treatment OR within 30 days of treatment?  no    Additional Comments: will receive next dose of chemo on Wednesday

## 2020-08-17 NOTE — H&P
RADIATION FOLLOW UP NOTE  DATE OF VISIT: 8/17/2020    DIAGNOSIS & STAGING: 3 cm head of pancreas mass with direct invasion of duodenum without vascular encasement. EUS node negative, no mets    PREVIOUS TREATMENT: Treatment Dates:                    July 20- Aug  7, 2020     Site: Dose: Total # fractions: Dose per fraction: Energy: Technique   Pancreatic head mass 36 Gy 15 2.4 Gy 10 MV photons 4 field      Concurrent therapy:             gemcitabine    CURRENT COMPLAINT: Routine followup and nutrition\    INTERVAL HISTORY:Here today for followup 1 week after completion of radiation still losing weight. Taking creon, no apetitite, not following eating schedule as directed. Using GasX for gas pains. No nausea or diarrhea reported. Seeing nutritionist today. Marek on 8/19. PAST MEDICAL HISTORY:   Past Medical History:   Diagnosis Date    Bladder cancer ([address] 02/1970    per patient    Cancer [facility])     prostate and bladder     Colon cancer [facility])        PAST SURGICAL HISTORY:  Past Surgical History:   Procedure Laterality Date    COLONOSCOPY  01/23/2019    ccf    ENDOSCOPIC ULTRASOUND (LOWER) N/A 3/26/2020    EUS performed by Magdaleno Padilla MD at [address] [place] [address] ERCP N/A 3/26/2020    EGD/ ERCP performed by Magdaleno Padilla MD at [address] [place] [address] ERCP N/A 5/21/2020    ERCP WITH PLASTIC STENT REMOVAL AND PLACEMENT of METAL STENT 91IUP85 mm BILE DUCT performed by Magdaleno Padilla MD at [address] [place] [address]     JOINT REPLACEMENT Bilateral     PROSTATE SURGERY         ALLERGIES:   Allergies   Allergen Reactions    Menthol (Topical Analgesic)      Skin reaction \"bubble up\"    Zolpidem      Other reaction(s): Hallucinations    Amben [Aminobenzoate] Rash        I have personally reviewed and reconciled the allergies listed. CURRENT MEDICATIONS:   Prior to Admission medications    Medication Sig Start Date End Date Taking?  Authorizing Provider   dronabinol (MARINOL) 2.5 MG capsule TAKE 1 CAPSULE BY MOUTH TWICE DAILY 8/12/20   Historical Provider, MD   ondansetron (ZOFRAN-ODT) 4 MG disintegrating tablet TAKE 1 TABLET BY MOUTH EVERY 4 HOURS FOR NAUSEA 8/12/20   Historical Provider, MD   simethicone (MYLICON) 658 MG chewable tablet take 1 (ONE) tablet by mouth four times a day after meals and at bedtime for gas] 8/12/20   Historical Provider, MD   docusate sodium (COLACE) 100 MG capsule Take 100 mg by mouth 2 times daily    Historical Provider, MD   senna (SENOKOT) 8.6 MG tablet Take 1 tablet by mouth daily    Historical Provider, MD   polyethylene glycol (GLYCOLAX) 17 GM/SCOOP powder Take 17 g by mouth daily    Historical Provider, MD   Probiotic Product ([address]) CAPS Take 1 tablet by mouth daily    Historical Provider, MD   lipase-protease-amylase (CREON) 88139 units CPEP delayed release capsule Take 2 capsules during meals Take 1 capsule during snacks 5/15/20   Amber Clay MD   Vitamin D, Ergocalciferol, 50 MCG (2000 UT) CAPS Take 1 tablet by mouth daily    Historical Provider, MD   pantoprazole (PROTONIX) 40 MG tablet Take 60 mg by mouth daily  3/13/20   Historical Provider, MD   Naproxen Sodium (ALEVE) 220 MG CAPS Take 1 capsule by mouth as needed for Pain    Historical Provider, MD       I have personally reviewed and reconciled the medications listed.     FAMILY HISTORY:   Family History   Problem Relation Age of Onset    Colon Cancer Mother     Breast Cancer Mother     Coronary Art Dis Father     Arthritis Sister     Celiac Disease Neg Hx     Crohn's Disease Neg Hx        SOCIAL HISTORY:   Social History     Tobacco Use   Smoking Status Never Smoker   Smokeless Tobacco Never Used     Social History     Substance and Sexual Activity   Alcohol Use Not Currently         Review Of Systems:   Pain Score:   Pain Score (1-10): \"not pain, just discomfort\"  Pain Location: left side of abdomen and across lower abdomen   Pain Duration: all day  Pain Management/Control: sitting participate in the care of this patient.   Sincerely,    Electronically signed by Андрей Howell MD on 8/17/20 at 1:53 PM EDT      cc:   MD Fer Meraz MD Sherman Caras, [address] [place] [address] Jey [address]  [facility], [address] [place] [address]

## 2020-08-17 NOTE — PROGRESS NOTES
Shaq Griffiths  8/17/2020  Wt Readings from Last 10 Encounters:   07/08/20 168 lb 3.2 oz (76.3 kg)   05/21/20 170 lb (77.1 kg)   03/25/20 173 lb 15.1 oz (78.9 kg)   03/24/20 168 lb (76.2 kg)   01/21/20 174 lb (78.9 kg)   12/20/19 178 lb (80.7 kg)   04/03/19 179 lb (81.2 kg)     Ht Readings from Last 1 Encounters:   07/08/20 6' 1\" (1.854 m)         Wt: 158. 4# today  (160.8 on 8/4)  Weight change: 2.4# wt loss x 2 wks   Appetite: poor  N/V/D/C: some nausea, denies emesis. Pt c/o abdominal discomfort throughout the day  Pt states he has started new med for appetite, not sure of name. No longer on Prednisone for anorexia. Drinks water throughout the day. Reviewed high calorie/high pro beverages. Pt not taking Creon with oral nutrition supplements--stopped taking them because they didn't agree with him. Instructed on when to take Creon. Encouraged pt to determine eating schedule for home in attempt to increase po intake. Recommendations:  Creon with oral nutrition supplements, increase meals to 6 per day.       F/u x 2 wks with RD to assess wt     Goal:  Stable wts    ASPEN GUIDELINES FOR CLINICAL CHARACTERISTICS OF MALNUTRITION IN CHRONIC ILLNESS     Moderate Malnutrition  Severe Malnutrition    Energy intake  <75% energy intake compared to estimated needs for >1month <75% energy intake compared to estimated needs for >1month   Weight changes  5% x 1 month  7.5% x 3 months   10% x 6 months   20% x 1 year  >5% x 1 month  >7.5% x 3 months   >10% x 6 months   >20% x 1 year    Physical findings  Mild   Decrease subcutaneous fat    Decrease muscle mass     Increase fluid/edema   Severe  Decrease subcutaneous fat    Decrease muscle mass     Increase fluid/edema        Paul Kramer

## 2020-09-07 ENCOUNTER — APPOINTMENT (OUTPATIENT)
Dept: CT IMAGING | Age: 85
End: 2020-09-07
Payer: MEDICARE

## 2020-09-07 ENCOUNTER — HOSPITAL ENCOUNTER (EMERGENCY)
Age: 85
Discharge: ANOTHER HOSPITAL | End: 2020-09-08
Attending: EMERGENCY MEDICINE
Payer: MEDICARE

## 2020-09-07 ENCOUNTER — APPOINTMENT (OUTPATIENT)
Dept: GENERAL RADIOLOGY | Age: 85
End: 2020-09-07
Payer: MEDICARE

## 2020-09-07 VITALS
SYSTOLIC BLOOD PRESSURE: 139 MMHG | RESPIRATION RATE: 16 BRPM | WEIGHT: 160 LBS | DIASTOLIC BLOOD PRESSURE: 89 MMHG | HEART RATE: 73 BPM | OXYGEN SATURATION: 99 % | TEMPERATURE: 98.5 F | HEIGHT: 73 IN | BODY MASS INDEX: 21.2 KG/M2

## 2020-09-07 LAB
ALBUMIN SERPL-MCNC: 3.9 G/DL (ref 3.5–4.6)
ALBUMIN SERPL-MCNC: 3.9 G/DL (ref 3.5–4.6)
ALP BLD-CCNC: 129 U/L (ref 35–104)
ALP BLD-CCNC: 129 U/L (ref 35–104)
ALT SERPL-CCNC: 24 U/L (ref 0–41)
ALT SERPL-CCNC: 25 U/L (ref 0–41)
AMMONIA: 28 UMOL/L (ref 16–60)
ANION GAP SERPL CALCULATED.3IONS-SCNC: 17 MEQ/L (ref 9–15)
APTT: 32.2 SEC (ref 24.4–36.8)
AST SERPL-CCNC: 24 U/L (ref 0–40)
AST SERPL-CCNC: 25 U/L (ref 0–40)
BASOPHILS ABSOLUTE: 0 K/UL (ref 0–0.2)
BASOPHILS RELATIVE PERCENT: 0.1 %
BILIRUB SERPL-MCNC: 0.9 MG/DL (ref 0.2–0.7)
BILIRUB SERPL-MCNC: 0.9 MG/DL (ref 0.2–0.7)
BILIRUBIN DIRECT: <0.2 MG/DL (ref 0–0.4)
BILIRUBIN URINE: ABNORMAL
BILIRUBIN, INDIRECT: ABNORMAL MG/DL (ref 0–0.6)
BLOOD, URINE: NEGATIVE
BUN BLDV-MCNC: 17 MG/DL (ref 8–23)
CALCIUM SERPL-MCNC: 10 MG/DL (ref 8.5–9.9)
CHLORIDE BLD-SCNC: 96 MEQ/L (ref 95–107)
CLARITY: CLEAR
CO2: 27 MEQ/L (ref 20–31)
COLOR: YELLOW
CREAT SERPL-MCNC: 0.83 MG/DL (ref 0.7–1.2)
EOSINOPHILS ABSOLUTE: 0 K/UL (ref 0–0.7)
EOSINOPHILS RELATIVE PERCENT: 0.2 %
GFR AFRICAN AMERICAN: >60
GFR NON-AFRICAN AMERICAN: >60
GLOBULIN: 3.4 G/DL (ref 2.3–3.5)
GLUCOSE BLD-MCNC: 114 MG/DL (ref 70–99)
GLUCOSE URINE: NEGATIVE MG/DL
HCT VFR BLD CALC: 38.6 % (ref 42–52)
HEMOGLOBIN: 12.8 G/DL (ref 14–18)
INR BLD: 1.2
KETONES, URINE: 15 MG/DL
LACTIC ACID, SEPSIS: 2.6 MMOL/L (ref 0.5–1.9)
LACTIC ACID: 2.6 MMOL/L (ref 0.5–2.2)
LEUKOCYTE ESTERASE, URINE: NEGATIVE
LIPASE: 5 U/L (ref 12–95)
LYMPHOCYTES ABSOLUTE: 0.9 K/UL (ref 1–4.8)
LYMPHOCYTES RELATIVE PERCENT: 4.6 %
MAGNESIUM: 2.1 MG/DL (ref 1.7–2.4)
MCH RBC QN AUTO: 33.2 PG (ref 27–31.3)
MCHC RBC AUTO-ENTMCNC: 33.2 % (ref 33–37)
MCV RBC AUTO: 99.9 FL (ref 80–100)
MONOCYTES ABSOLUTE: 1.5 K/UL (ref 0.2–0.8)
MONOCYTES RELATIVE PERCENT: 7.6 %
NEUTROPHILS ABSOLUTE: 16.7 K/UL (ref 1.4–6.5)
NEUTROPHILS RELATIVE PERCENT: 87.5 %
NITRITE, URINE: NEGATIVE
PDW BLD-RTO: 16 % (ref 11.5–14.5)
PH UA: 5.5 (ref 5–9)
PLATELET # BLD: 186 K/UL (ref 130–400)
POTASSIUM REFLEX MAGNESIUM: 3.5 MEQ/L (ref 3.4–4.9)
PRO-BNP: 711 PG/ML
PROTEIN UA: ABNORMAL MG/DL
PROTHROMBIN TIME: 14.9 SEC (ref 12.3–14.9)
RBC # BLD: 3.86 M/UL (ref 4.7–6.1)
SODIUM BLD-SCNC: 140 MEQ/L (ref 135–144)
SPECIFIC GRAVITY UA: 1.02 (ref 1–1.03)
TOTAL PROTEIN: 7.3 G/DL (ref 6.3–8)
TOTAL PROTEIN: 7.3 G/DL (ref 6.3–8)
TROPONIN: <0.01 NG/ML (ref 0–0.01)
UROBILINOGEN, URINE: 0.2 E.U./DL
WBC # BLD: 19.1 K/UL (ref 4.8–10.8)

## 2020-09-07 PROCEDURE — 96376 TX/PRO/DX INJ SAME DRUG ADON: CPT

## 2020-09-07 PROCEDURE — 80053 COMPREHEN METABOLIC PANEL: CPT

## 2020-09-07 PROCEDURE — 96365 THER/PROPH/DIAG IV INF INIT: CPT

## 2020-09-07 PROCEDURE — 85610 PROTHROMBIN TIME: CPT

## 2020-09-07 PROCEDURE — 71046 X-RAY EXAM CHEST 2 VIEWS: CPT

## 2020-09-07 PROCEDURE — 85025 COMPLETE CBC W/AUTO DIFF WBC: CPT

## 2020-09-07 PROCEDURE — 83880 ASSAY OF NATRIURETIC PEPTIDE: CPT

## 2020-09-07 PROCEDURE — 36415 COLL VENOUS BLD VENIPUNCTURE: CPT

## 2020-09-07 PROCEDURE — 2500000003 HC RX 250 WO HCPCS: Performed by: EMERGENCY MEDICINE

## 2020-09-07 PROCEDURE — 2580000003 HC RX 258: Performed by: EMERGENCY MEDICINE

## 2020-09-07 PROCEDURE — 6360000002 HC RX W HCPCS: Performed by: EMERGENCY MEDICINE

## 2020-09-07 PROCEDURE — 74177 CT ABD & PELVIS W/CONTRAST: CPT

## 2020-09-07 PROCEDURE — 83605 ASSAY OF LACTIC ACID: CPT

## 2020-09-07 PROCEDURE — 81003 URINALYSIS AUTO W/O SCOPE: CPT

## 2020-09-07 PROCEDURE — 99285 EMERGENCY DEPT VISIT HI MDM: CPT

## 2020-09-07 PROCEDURE — 84484 ASSAY OF TROPONIN QUANT: CPT

## 2020-09-07 PROCEDURE — 96375 TX/PRO/DX INJ NEW DRUG ADDON: CPT

## 2020-09-07 PROCEDURE — 82140 ASSAY OF AMMONIA: CPT

## 2020-09-07 PROCEDURE — 83735 ASSAY OF MAGNESIUM: CPT

## 2020-09-07 PROCEDURE — 83690 ASSAY OF LIPASE: CPT

## 2020-09-07 PROCEDURE — 6360000004 HC RX CONTRAST MEDICATION: Performed by: EMERGENCY MEDICINE

## 2020-09-07 PROCEDURE — 85730 THROMBOPLASTIN TIME PARTIAL: CPT

## 2020-09-07 RX ORDER — ONDANSETRON 2 MG/ML
4 INJECTION INTRAMUSCULAR; INTRAVENOUS ONCE
Status: COMPLETED | OUTPATIENT
Start: 2020-09-07 | End: 2020-09-07

## 2020-09-07 RX ORDER — 0.9 % SODIUM CHLORIDE 0.9 %
1000 INTRAVENOUS SOLUTION INTRAVENOUS ONCE
Status: COMPLETED | OUTPATIENT
Start: 2020-09-07 | End: 2020-09-07

## 2020-09-07 RX ADMIN — HYDROMORPHONE HYDROCHLORIDE 1 MG: 1 INJECTION, SOLUTION INTRAMUSCULAR; INTRAVENOUS; SUBCUTANEOUS at 19:20

## 2020-09-07 RX ADMIN — SODIUM CHLORIDE 1000 ML: 9 INJECTION, SOLUTION INTRAVENOUS at 19:19

## 2020-09-07 RX ADMIN — PIPERACILLIN AND TAZOBACTAM 3.38 G: 3; .375 INJECTION, POWDER, FOR SOLUTION INTRAVENOUS at 23:12

## 2020-09-07 RX ADMIN — ONDANSETRON 4 MG: 2 INJECTION INTRAMUSCULAR; INTRAVENOUS at 19:20

## 2020-09-07 RX ADMIN — IOPAMIDOL 100 ML: 755 INJECTION, SOLUTION INTRAVENOUS at 20:17

## 2020-09-07 RX ADMIN — ONDANSETRON 4 MG: 2 INJECTION INTRAMUSCULAR; INTRAVENOUS at 23:09

## 2020-09-07 RX ADMIN — HYDROMORPHONE HYDROCHLORIDE 1 MG: 1 INJECTION, SOLUTION INTRAMUSCULAR; INTRAVENOUS; SUBCUTANEOUS at 23:10

## 2020-09-07 RX ADMIN — FAMOTIDINE 20 MG: 10 INJECTION INTRAVENOUS at 19:20

## 2020-09-07 ASSESSMENT — PAIN DESCRIPTION - LOCATION: LOCATION: ABDOMEN;CHEST

## 2020-09-07 ASSESSMENT — PAIN DESCRIPTION - FREQUENCY: FREQUENCY: INTERMITTENT

## 2020-09-07 ASSESSMENT — PAIN DESCRIPTION - ONSET: ONSET: ON-GOING

## 2020-09-07 ASSESSMENT — PAIN DESCRIPTION - PAIN TYPE: TYPE: ACUTE PAIN

## 2020-09-07 ASSESSMENT — PAIN DESCRIPTION - DESCRIPTORS: DESCRIPTORS: PATIENT UNABLE TO DESCRIBE

## 2020-09-07 ASSESSMENT — PAIN SCALES - WONG BAKER: WONGBAKER_NUMERICALRESPONSE: 0

## 2020-09-07 ASSESSMENT — PAIN SCALES - GENERAL
PAINLEVEL_OUTOF10: 6
PAINLEVEL_OUTOF10: 0
PAINLEVEL_OUTOF10: 4

## 2020-09-07 NOTE — ED PROVIDER NOTES
 senna (SENOKOT) 8.6 MG tablet Take 1 tablet by mouth daily      polyethylene glycol (GLYCOLAX) 17 GM/SCOOP powder Take 17 g by mouth daily      Probiotic Product ([address]) CAPS Take 1 tablet by mouth daily      lipase-protease-amylase (CREON) 70956 units CPEP delayed release capsule Take 2 capsules during meals Take 1 capsule during snacks 250 capsule 3    Vitamin D, Ergocalciferol, 50 MCG (2000 UT) CAPS Take 1 tablet by mouth daily      pantoprazole (PROTONIX) 40 MG tablet Take 60 mg by mouth daily       Naproxen Sodium (ALEVE) 220 MG CAPS Take 1 capsule by mouth as needed for Pain         ALLERGIES    Allergies   Allergen Reactions    Menthol (Topical Analgesic)      Skin reaction \"bubble up\"    Zolpidem      Other reaction(s): Hallucinations    Amben [Aminobenzoate] Rash       FAMILY HISTORY    Family History   Problem Relation Age of Onset    Colon Cancer Mother     Breast Cancer Mother     Coronary Art Dis Father     Arthritis Sister     Celiac Disease Neg Hx     Crohn's Disease Neg Hx        SOCIAL HISTORY    Social History     Socioeconomic History    Marital status:      Spouse name: None    Number of children: None    Years of education: None    Highest education level: None   Occupational History    None   Social Needs    Financial resource strain: None    Food insecurity     Worry: None     Inability: None    Transportation needs     Medical: None     Non-medical: None   Tobacco Use    Smoking status: Never Smoker    Smokeless tobacco: Never Used   Substance and Sexual Activity    Alcohol use: Not Currently    Drug use: Never    Sexual activity: None   Lifestyle    Physical activity     Days per week: None     Minutes per session: None    Stress: None   Relationships    Social connections     Talks on phone: None     Gets together: None     Attends Denominational service: None     Active member of club or organization: None     Attends meetings of clubs or organizations: None     Relationship status: None    Intimate partner violence     Fear of current or ex partner: None     Emotionally abused: None     Physically abused: None     Forced sexual activity: None   Other Topics Concern    None   Social History Narrative    None       REVIEW OF SYSTEMS    Constitutional:  Denies fever, chills, weight loss or weakness   Eyes:  Denies photophobia or discharge   HENT:  Denies sore throat or ear pain   Respiratory:  Denies cough or shortness of breath   Cardiovascular:  Denies chest pain, palpitations or swelling   GI: Complains of abdominal pain, nausea, and vomiting  Musculoskeletal:  Denies back pain   Skin:  Denies rash   Neurologic:  Denies headache, focal weakness or sensory changes   Endocrine:  Denies polyuria or polydypsia   Lymphatic:  Denies swollen glands   Psychiatric:  Denies depression, suicidal ideation or homicidal ideation   All systems negative except as marked. PHYSICAL EXAM    VITAL SIGNS: BP (!) 141/91   Pulse 82   Temp 98.5 °F (36.9 °C) (Oral)   Resp 16   Ht 6' 1\" (1.854 m)   Wt 160 lb (72.6 kg)   SpO2 95%   BMI 21.11 kg/m²    Constitutional:  Well developed, Well nourished, mild acute distress, Non-toxic appearance. HENT:  Normocephalic, Atraumatic, Bilateral external ears normal, Oropharynx dry, No oral exudates, Nose normal. Neck- Normal range of motion, No tenderness, Supple, No stridor. Eyes:  PERRL, EOMI, Conjunctiva normal, No discharge. Respiratory:  Normal breath sounds, No respiratory distress, No wheezing, No chest tenderness. Cardiovascular:  Normal heart rate, Normal rhythm, No murmurs, No rubs, No gallops. GI:  Bowel sounds normal, Soft, epigastric left upper quadrant tenderness, No masses, No pulsatile masses. No rebound or rigidity  :. No CVA tenderness. Musculoskeletal:  Intact distal pulses, No edema, No tenderness, No cyanosis, No clubbing. Good range of motion in all major joints.  No tenderness to palpation or major deformities noted. Back- No tenderness. Integument:  Warm, Dry, No erythema, No rash. Lymphatic:  No lymphadenopathy noted. Neurologic:  Alert & oriented x 3, Normal motor function, Normal sensory function, No focal deficits noted. Psychiatric:  Affect normal, Judgment normal, Mood normal.         RADIOLOGY    XR CHEST (2 VW)    (Results Pending)   CT ABDOMEN PELVIS W IV CONTRAST Additional Contrast? None    (Results Pending)       REEVALUATION   Patient was updated the results of labs and Radiology.   Spoke with the hospitalist accepted patient admission  Labs  Labs Reviewed   CBC WITH AUTO DIFFERENTIAL - Abnormal; Notable for the following components:       Result Value    WBC 19.1 (*)     RBC 3.86 (*)     Hemoglobin 12.8 (*)     Hematocrit 38.6 (*)     MCH 33.2 (*)     RDW 16.0 (*)     Neutrophils Absolute 16.7 (*)     Lymphocytes Absolute 0.9 (*)     Monocytes Absolute 1.5 (*)     All other components within normal limits   COMPREHENSIVE METABOLIC PANEL W/ REFLEX TO MG FOR LOW K - Abnormal; Notable for the following components:    Anion Gap 17 (*)     Glucose 114 (*)     Calcium 10.0 (*)     Total Bilirubin 0.9 (*)     Alkaline Phosphatase 129 (*)     All other components within normal limits   URINALYSIS - Abnormal; Notable for the following components:    Ketones, Urine 15 (*)     All other components within normal limits   LIPASE - Abnormal; Notable for the following components:    Lipase 5 (*)     All other components within normal limits   LACTATE, SEPSIS - Abnormal; Notable for the following components:    Lactic Acid, Sepsis 2.6 (*)     All other components within normal limits   HEPATIC FUNCTION PANEL - Abnormal; Notable for the following components:    Alkaline Phosphatase 129 (*)     Total Bilirubin 0.9 (*)     All other components within normal limits   LACTIC ACID, PLASMA - Abnormal; Notable for the following components:    Lactic Acid 2.6 (*)     All other components within normal limits   CULTURE, BLOOD 1   CULTURE, BLOOD 2   TROPONIN   BRAIN NATRIURETIC PEPTIDE   AMMONIA   PROTIME-INR   APTT   MAGNESIUM             Summation      Patient Course:     ED Medications administered this visit:    Medications   piperacillin-tazobactam (ZOSYN) 3.375 g in dextrose 5 % 50 mL IVPB (mini-bag) (has no administration in time range)   0.9 % sodium chloride bolus (0 mLs Intravenous Stopped 9/7/20 2156)   famotidine (PEPCID) injection 20 mg (20 mg Intravenous Given 9/7/20 1920)   HYDROmorphone (DILAUDID) injection 1 mg (1 mg Intravenous Given 9/7/20 1920)   ondansetron (ZOFRAN) injection 4 mg (4 mg Intravenous Given 9/7/20 1920)   iopamidol (ISOVUE-370) 76 % injection 100 mL (100 mLs Intravenous Given 9/7/20 2017)       New Prescriptions from this visit:    New Prescriptions    No medications on file       Follow-up:  No follow-up provider specified. Final Impression:   1. Leukocytosis, unspecified type    2.  Hypercalcemia               (Please note that portions of this note were completed with a voice recognition program.  Efforts were made to edit the dictations but occasionally words are mis-transcribed.)          Nneka Capps MD  09/07/20 [id number]

## 2020-09-08 ENCOUNTER — APPOINTMENT (OUTPATIENT)
Dept: ULTRASOUND IMAGING | Age: 85
DRG: 435 | End: 2020-09-08
Attending: INTERNAL MEDICINE
Payer: MEDICARE

## 2020-09-08 ENCOUNTER — HOSPITAL ENCOUNTER (INPATIENT)
Age: 85
LOS: 1 days | Discharge: HOSPICE | DRG: 435 | End: 2020-09-09
Attending: INTERNAL MEDICINE | Admitting: INTERNAL MEDICINE
Payer: MEDICARE

## 2020-09-08 PROBLEM — D72.829 LEUKOCYTOSIS: Status: ACTIVE | Noted: 2020-09-08

## 2020-09-08 PROBLEM — C25.0 MALIGNANT NEOPLASM OF HEAD OF PANCREAS (HCC): Status: ACTIVE | Noted: 2020-09-08

## 2020-09-08 PROBLEM — C79.9 METASTASIS FROM PANCREATIC CANCER (HCC): Status: ACTIVE | Noted: 2020-09-08

## 2020-09-08 PROBLEM — R79.89 ELEVATED LACTIC ACID LEVEL: Status: ACTIVE | Noted: 2020-09-08

## 2020-09-08 PROBLEM — E43 SEVERE MALNUTRITION (HCC): Status: ACTIVE | Noted: 2020-09-08

## 2020-09-08 PROBLEM — C25.9 METASTASIS FROM PANCREATIC CANCER (HCC): Status: ACTIVE | Noted: 2020-09-08

## 2020-09-08 PROBLEM — E83.52 HYPERCALCEMIA: Status: ACTIVE | Noted: 2020-09-08

## 2020-09-08 LAB
ALBUMIN SERPL-MCNC: 3.3 G/DL (ref 3.5–4.6)
ALP BLD-CCNC: 110 U/L (ref 35–104)
ALT SERPL-CCNC: 20 U/L (ref 0–41)
ANION GAP SERPL CALCULATED.3IONS-SCNC: 12 MEQ/L (ref 9–15)
AST SERPL-CCNC: 19 U/L (ref 0–40)
BILIRUB SERPL-MCNC: 0.5 MG/DL (ref 0.2–0.7)
BUN BLDV-MCNC: 16 MG/DL (ref 8–23)
CALCIUM SERPL-MCNC: 9.9 MG/DL (ref 8.5–9.9)
CHLORIDE BLD-SCNC: 102 MEQ/L (ref 95–107)
CO2: 27 MEQ/L (ref 20–31)
CREAT SERPL-MCNC: 0.77 MG/DL (ref 0.7–1.2)
GFR AFRICAN AMERICAN: >60
GFR NON-AFRICAN AMERICAN: >60
GLOBULIN: 3.3 G/DL (ref 2.3–3.5)
GLUCOSE BLD-MCNC: 107 MG/DL (ref 70–99)
POTASSIUM REFLEX MAGNESIUM: 5.5 MEQ/L (ref 3.4–4.9)
SODIUM BLD-SCNC: 141 MEQ/L (ref 135–144)
TOTAL PROTEIN: 6.6 G/DL (ref 6.3–8)

## 2020-09-08 PROCEDURE — 6360000002 HC RX W HCPCS: Performed by: NURSE PRACTITIONER

## 2020-09-08 PROCEDURE — 6360000002 HC RX W HCPCS: Performed by: INTERNAL MEDICINE

## 2020-09-08 PROCEDURE — 80053 COMPREHEN METABOLIC PANEL: CPT

## 2020-09-08 PROCEDURE — 2580000003 HC RX 258: Performed by: NURSE PRACTITIONER

## 2020-09-08 PROCEDURE — 2580000003 HC RX 258: Performed by: INTERNAL MEDICINE

## 2020-09-08 PROCEDURE — 6360000002 HC RX W HCPCS

## 2020-09-08 PROCEDURE — 6370000000 HC RX 637 (ALT 250 FOR IP): Performed by: NURSE PRACTITIONER

## 2020-09-08 PROCEDURE — 76705 ECHO EXAM OF ABDOMEN: CPT

## 2020-09-08 PROCEDURE — 36415 COLL VENOUS BLD VENIPUNCTURE: CPT

## 2020-09-08 PROCEDURE — 1210000000 HC MED SURG R&B

## 2020-09-08 PROCEDURE — 86301 IMMUNOASSAY TUMOR CA 19-9: CPT

## 2020-09-08 PROCEDURE — C9113 INJ PANTOPRAZOLE SODIUM, VIA: HCPCS | Performed by: INTERNAL MEDICINE

## 2020-09-08 PROCEDURE — 87040 BLOOD CULTURE FOR BACTERIA: CPT

## 2020-09-08 RX ORDER — ONDANSETRON 2 MG/ML
4 INJECTION INTRAMUSCULAR; INTRAVENOUS EVERY 6 HOURS PRN
Status: DISCONTINUED | OUTPATIENT
Start: 2020-09-08 | End: 2020-09-09 | Stop reason: HOSPADM

## 2020-09-08 RX ORDER — ACETAMINOPHEN 650 MG/1
650 SUPPOSITORY RECTAL EVERY 6 HOURS PRN
Status: DISCONTINUED | OUTPATIENT
Start: 2020-09-08 | End: 2020-09-09 | Stop reason: HOSPADM

## 2020-09-08 RX ORDER — MORPHINE SULFATE 4 MG/ML
4 INJECTION, SOLUTION INTRAMUSCULAR; INTRAVENOUS ONCE
Status: COMPLETED | OUTPATIENT
Start: 2020-09-08 | End: 2020-09-08

## 2020-09-08 RX ORDER — MORPHINE SULFATE 2 MG/ML
2 INJECTION, SOLUTION INTRAMUSCULAR; INTRAVENOUS EVERY 4 HOURS PRN
Status: DISCONTINUED | OUTPATIENT
Start: 2020-09-08 | End: 2020-09-09 | Stop reason: HOSPADM

## 2020-09-08 RX ORDER — PROMETHAZINE HYDROCHLORIDE 12.5 MG/1
12.5 TABLET ORAL EVERY 6 HOURS PRN
Status: DISCONTINUED | OUTPATIENT
Start: 2020-09-08 | End: 2020-09-09 | Stop reason: HOSPADM

## 2020-09-08 RX ORDER — L.RHAMNOSUS/B.ANIMALIS(LACTIS) 3B CELL
1 CAPSULE ORAL DAILY
Status: DISCONTINUED | OUTPATIENT
Start: 2020-09-08 | End: 2020-09-09 | Stop reason: HOSPADM

## 2020-09-08 RX ORDER — SODIUM CHLORIDE 0.9 % (FLUSH) 0.9 %
10 SYRINGE (ML) INJECTION PRN
Status: DISCONTINUED | OUTPATIENT
Start: 2020-09-08 | End: 2020-09-09 | Stop reason: HOSPADM

## 2020-09-08 RX ORDER — ACETAMINOPHEN 325 MG/1
650 TABLET ORAL EVERY 6 HOURS PRN
Status: DISCONTINUED | OUTPATIENT
Start: 2020-09-08 | End: 2020-09-09 | Stop reason: HOSPADM

## 2020-09-08 RX ORDER — PANTOPRAZOLE SODIUM 40 MG/10ML
40 INJECTION, POWDER, LYOPHILIZED, FOR SOLUTION INTRAVENOUS DAILY
Status: DISCONTINUED | OUTPATIENT
Start: 2020-09-08 | End: 2020-09-09 | Stop reason: HOSPADM

## 2020-09-08 RX ORDER — SODIUM CHLORIDE 9 MG/ML
10 INJECTION INTRAVENOUS DAILY
Status: DISCONTINUED | OUTPATIENT
Start: 2020-09-08 | End: 2020-09-09 | Stop reason: HOSPADM

## 2020-09-08 RX ORDER — SODIUM CHLORIDE 0.9 % (FLUSH) 0.9 %
10 SYRINGE (ML) INJECTION EVERY 12 HOURS SCHEDULED
Status: DISCONTINUED | OUTPATIENT
Start: 2020-09-08 | End: 2020-09-09 | Stop reason: HOSPADM

## 2020-09-08 RX ORDER — POLYETHYLENE GLYCOL 3350 17 G/17G
17 POWDER, FOR SOLUTION ORAL DAILY PRN
Status: DISCONTINUED | OUTPATIENT
Start: 2020-09-08 | End: 2020-09-09 | Stop reason: HOSPADM

## 2020-09-08 RX ORDER — SODIUM CHLORIDE 9 MG/ML
INJECTION, SOLUTION INTRAVENOUS CONTINUOUS
Status: DISPENSED | OUTPATIENT
Start: 2020-09-08 | End: 2020-09-08

## 2020-09-08 RX ORDER — MORPHINE SULFATE 4 MG/ML
INJECTION, SOLUTION INTRAMUSCULAR; INTRAVENOUS
Status: COMPLETED
Start: 2020-09-08 | End: 2020-09-08

## 2020-09-08 RX ORDER — SODIUM CHLORIDE 9 MG/ML
INJECTION, SOLUTION INTRAVENOUS CONTINUOUS
Status: DISPENSED | OUTPATIENT
Start: 2020-09-08 | End: 2020-09-09

## 2020-09-08 RX ADMIN — PROMETHAZINE HYDROCHLORIDE 12.5 MG: 12.5 TABLET ORAL at 21:35

## 2020-09-08 RX ADMIN — MORPHINE SULFATE 2 MG: 2 INJECTION, SOLUTION INTRAMUSCULAR; INTRAVENOUS at 16:46

## 2020-09-08 RX ADMIN — SODIUM CHLORIDE: 9 INJECTION, SOLUTION INTRAVENOUS at 16:37

## 2020-09-08 RX ADMIN — ENOXAPARIN SODIUM 40 MG: 40 INJECTION SUBCUTANEOUS at 09:23

## 2020-09-08 RX ADMIN — ONDANSETRON 4 MG: 2 INJECTION INTRAMUSCULAR; INTRAVENOUS at 16:45

## 2020-09-08 RX ADMIN — Medication 10 ML: at 12:36

## 2020-09-08 RX ADMIN — PANTOPRAZOLE SODIUM 40 MG: 40 INJECTION, POWDER, FOR SOLUTION INTRAVENOUS at 12:36

## 2020-09-08 RX ADMIN — MORPHINE SULFATE 4 MG: 4 INJECTION, SOLUTION INTRAMUSCULAR; INTRAVENOUS at 17:01

## 2020-09-08 RX ADMIN — MORPHINE SULFATE 2 MG: 2 INJECTION, SOLUTION INTRAMUSCULAR; INTRAVENOUS at 12:30

## 2020-09-08 RX ADMIN — PIPERACILLIN AND TAZOBACTAM 3.38 G: 3; .375 INJECTION, POWDER, FOR SOLUTION INTRAVENOUS at 09:24

## 2020-09-08 RX ADMIN — MORPHINE SULFATE 2 MG: 2 INJECTION, SOLUTION INTRAMUSCULAR; INTRAVENOUS at 21:35

## 2020-09-08 RX ADMIN — ONDANSETRON 4 MG: 2 INJECTION INTRAMUSCULAR; INTRAVENOUS at 07:19

## 2020-09-08 RX ADMIN — SODIUM CHLORIDE: 9 INJECTION, SOLUTION INTRAVENOUS at 02:37

## 2020-09-08 ASSESSMENT — ENCOUNTER SYMPTOMS
COUGH: 0
BLOOD IN STOOL: 0
SHORTNESS OF BREATH: 0
VOMITING: 1
WHEEZING: 0
DIARRHEA: 0
ABDOMINAL PAIN: 1
BACK PAIN: 0
CONSTIPATION: 0
RHINORRHEA: 0
SORE THROAT: 0
NAUSEA: 1

## 2020-09-08 ASSESSMENT — PAIN SCALES - GENERAL
PAINLEVEL_OUTOF10: 7
PAINLEVEL_OUTOF10: 3
PAINLEVEL_OUTOF10: 7

## 2020-09-08 NOTE — PROGRESS NOTES
Per Jim Meraz with [facility], plan for meeting with family at 57 on 9-9-2020. Will continue to monitor.  Electronically signed by Grace Epstein RN on 9/8/2020 at 6:25 PM

## 2020-09-08 NOTE — PROGRESS NOTES
Assumed care of pt at 0700, assessment performed. Pt complains of pain, medicated with PRN Morphine per MAR. Also complains of intermittent nausea, states he has a decrease in appetite, medicated with PRN Zofran. Pt and family anxious for DC. Pt left resting in bed with call light within reach and bed alarm on. Will continue to monitor.  Electronically signed by Sandhya Bullock RN on 9/8/2020 at 3:02 PM

## 2020-09-08 NOTE — PLAN OF CARE
History and physical completed after midnight by admitting physician. I evaluated and examined the patient. He has worsening abdominal pain as well as nausea and belching for the past 2 days. 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 There is also finding of metastatic lesions in the liver. He will have evaluation by gastroenterology and oncology teams today. Gastroenterology can consider intervention to relieve this obstruction. Oncology can discuss further treatment options and prognosis. CT Abd/Pelvis  1.  Mass concerning for metastatic disease in the proximal small bowel which shows dilatation in the bulb and slightly more distally the bowel shows bowel wall thickening and luminal narrowing. This mass is thought to possibly be causing gastric outlet    obstruction. The stomach is dilated. 2. Multiple metastatic lesions are seen in the liver. There is pneumobilia. 3. Air is seen in the gallbladder which is likely iatrogenic.  There is a stent again seen in the common bile duct terminating in duodenum with mild enhancement of the bile ducts which could reflect cholangitis.

## 2020-09-08 NOTE — ONCOLOGY
Hematology/Oncology Consult  Encounter Date: 2020 3:49 PM    Mr. Cecy Garza is a 81 y.o. male  : 1935  MRN: [id number]  Neetu Number: [de-identified]  Requesting Provider: Fransisca Melo DO    Reason for request: metastatic pancreas cancer      CONSULTANT: Liban Engle    HPI: Patient has pancreas cancer sp chemotherapy and radiation. He was admitted with progressive abdominal pain, nausea, and anorexia.     Patient Active Problem List   Diagnosis    Malignant neoplasm of unspecified kidney, except renal pelvis ([address]    Benign neoplasm of other specified sites of skin    Benign prostatic hyperplasia with urinary obstruction    Choroidal nevus of left eye    Cobblestone retinal degeneration    Combined form of senile cataract of both eyes    Common wart    Diffuse photodamage of skin    Дмитрий hematuria    Generalized osteoarthrosis, unspecified site    HLD (hyperlipidemia)    Hyperopia with astigmatism and presbyopia    Impotence of organic origin    Incomplete bladder emptying    Primary malignant neoplasm of bladder ([address]    Nonexudative age-related macular degeneration, bilateral, intermediate dry stage    Osteoarthritis, shoulder    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    Posterior vitreous detachment of both eyes    S/P knee replacement    Seborrheic keratosis    Thyrotoxicosis without mention of goiter or other cause, without mention of thyrotoxic crisis or storm    Solitary pulmonary nodule    Obstructive jaundice due to malignant neoplasm ([address]    Mass of pancreas    Leukocytosis    Hypercalcemia    Malignant neoplasm of head of pancreas ([address]    Elevated lactic acid level    Severe malnutrition ([address]    Metastasis from pancreatic cancer [facility])     Past Medical History:   Diagnosis Date    Bladder cancer ([address] 1970    per patient    Cancer [facility])     prostate and bladder     Colon cancer ([address]      @[facility]@  Family History   Problem Relation Age of Onset  Colon Cancer Mother     Breast Cancer Mother     Coronary Art Dis Father     Arthritis Sister     Celiac Disease Neg Hx     Crohn's Disease Neg Hx      Social History     Socioeconomic History    Marital status:      Spouse name: Not on file    Number of children: Not on file    Years of education: Not on file    Highest education level: Not on file   Occupational History    Not on file   Social Needs    Financial resource strain: Not on file    Food insecurity     Worry: Not on file     Inability: Not on file    Transportation needs     Medical: Not on file     Non-medical: Not on file   Tobacco Use    Smoking status: Never Smoker    Smokeless tobacco: Never Used   Substance and Sexual Activity    Alcohol use: Not Currently    Drug use: Never    Sexual activity: Not on file   Lifestyle    Physical activity     Days per week: Not on file     Minutes per session: Not on file    Stress: Not on file   Relationships    Social connections     Talks on phone: Not on file     Gets together: Not on file     Attends Mosque service: Not on file     Active member of club or organization: Not on file     Attends meetings of clubs or organizations: Not on file     Relationship status: Not on file    Intimate partner violence     Fear of current or ex partner: Not on file     Emotionally abused: Not on file     Physically abused: Not on file     Forced sexual activity: Not on file   Other Topics Concern    Not on file   Social History Narrative    Not on file     Current Facility-Administered Medications   Medication Dose Route Frequency Provider Last Rate Last Dose    sodium chloride flush 0.9 % injection 10 mL  10 mL Intravenous 2 times per day Emilie Doss RN, NP        sodium chloride flush 0.9 % injection 10 mL  10 mL Intravenous PRN Emilie Doss RN, NP        acetaminophen (TYLENOL) tablet 650 mg  650 mg Oral Q6H PRN Emilie Doss RN, NP        Or   [facility] acetaminophen (TYLENOL) suppository 650 mg  650 mg Rectal Q6H PRN Emilie Doss RN, NP        polyethylene glycol (GLYCOLAX) packet 17 g  17 g Oral Daily PRN Emilie Doss RN, NP        promethazine (PHENERGAN) tablet 12.5 mg  12.5 mg Oral Q6H PRN Emilie Doss RN, NP        Or    ondansetron (ZOFRAN) injection 4 mg  4 mg Intravenous Q6H PRN Emilie Doss RN, NP   4 mg at 09/08/20 0719    enoxaparin (LOVENOX) injection 40 mg  40 mg Subcutaneous Daily Emilie Doss RN, NP   40 mg at 09/08/20 0923    lipase-protease-amylase (CREON) delayed release capsule 36,000 Units  36,000 Units Oral TID WC Emilie Doss RN, NP       [employer] [facility] CAPS 1 tablet  1 tablet Oral Daily Emilie Doss RN, NP        pantoprazole (PROTONIX) injection 40 mg  40 mg Intravenous Daily Nikole Flight, DO   40 mg at 09/08/20 1236    And    sodium chloride (PF) 0.9 % injection 10 mL  10 mL Intravenous Daily Nikole Flight, DO   10 mL at 09/08/20 1236    morphine (PF) injection 2 mg  2 mg Intravenous Q4H PRN Nikole Flight, DO   2 mg at 09/08/20 1230    0.9 % sodium chloride infusion   Intravenous Continuous Nikole Flight, DO         [unfilled]  Allergies   Allergen Reactions    Menthol (Topical Analgesic)      Skin reaction \"bubble up\"    Zolpidem      Other reaction(s): Hallucinations    Amben [Aminobenzoate] Rash        Review of Systems  Not eating well. Moderate abdominal pain. Some nausea. Moderate weight loss. No fever, chills, or night sweats. Some SCANLON. Mild confusion. Otherwise ROS are negative. PHYSICAL EXAMINATION:   VITAL SIGNS: BP (!) 158/78   Pulse 73   Temp 98.2 °F (36.8 °C) (Oral)   Resp 16   Ht 6' 1\" (1.854 m)   Wt 154 lb 5.2 oz (70 kg)   SpO2 99%   BMI 20.36 kg/m²     GENERAL: In no acute distress, thin, mildly confused. SKIN: Warm and dry, withoutjaundice, ecchymoses, or petechiae.   HEENT: Normocephalic, sclera anicteric, oral mucosa moist without lesion or exudate in the visible oral cavity or oropharynx, tongue mid-line with good mobility and no deviation with extension. NODES: No palpable adenopathy in the neck Levels I-V, bilateral   Supraclavicular fossae, axillary chains, or inguinal regions. LUNGS: Good inspiratory effort, no accessory muscle use, clear bilaterally, no focal wheeze, rales or rhonchi. CARDIAC: Regular rate and rhythm, without murmurs, rubs or gallops. ABDOMINAL: Normal bowel sounds present, liver edge palpable. MUSKL: Full ROM. EXTREMITIES:No edema. NEUROLOGIC:No grossly apparent focal deficits.     LAB RESULTS:  Recent Results (from the past 24 hour(s))   CBC Auto Differential    Collection Time: 09/07/20  7:24 PM   Result Value Ref Range    WBC 19.1 (H) 4.8 - 10.8 K/uL    RBC 3.86 (L) 4.70 - 6.10 M/uL    Hemoglobin 12.8 (L) 14.0 - 18.0 g/dL    Hematocrit 38.6 (L) 42.0 - 52.0 %    MCV 99.9 80.0 - 100.0 fL    MCH 33.2 (H) 27.0 - 31.3 pg    MCHC 33.2 33.0 - 37.0 %    RDW 16.0 (H) 11.5 - 14.5 %    Platelets 706 737 - 795 K/uL    Neutrophils % 87.5 %    Lymphocytes % 4.6 %    Monocytes % 7.6 %    Eosinophils % 0.2 %    Basophils % 0.1 %    Neutrophils Absolute 16.7 (H) 1.4 - 6.5 K/uL    Lymphocytes Absolute 0.9 (L) 1.0 - 4.8 K/uL    Monocytes Absolute 1.5 (H) 0.2 - 0.8 K/uL    Eosinophils Absolute 0.0 0.0 - 0.7 K/uL    Basophils Absolute 0.0 0.0 - 0.2 K/uL   Comprehensive Metabolic Panel w/ Reflex to MG    Collection Time: 09/07/20  7:24 PM   Result Value Ref Range    Sodium 140 135 - 144 mEq/L    Potassium reflex Magnesium 3.5 3.4 - 4.9 mEq/L    Chloride 96 95 - 107 mEq/L    CO2 27 20 - 31 mEq/L    Anion Gap 17 (H) 9 - 15 mEq/L    Glucose 114 (H) 70 - 99 mg/dL    BUN 17 8 - 23 mg/dL    CREATININE 0.83 0.70 - 1.20 mg/dL    GFR Non-African American >60.0 >60    GFR  >60.0 >60    Calcium 10.0 (H) 8.5 - 9.9 mg/dL    Total Protein 7.3 6.3 - 8.0 g/dL    Alb 3.9 3.5 - 4.6 g/dL    Total Bilirubin 0.9 (H) 0.2 - 0.7 mg/dL    Alkaline Phosphatase 129 (H) 35 - 104 U/L    ALT 25 0 - 41 U/L    AST 25 0 - 40 U/L    Globulin 3.4 2.3 - 3.5 g/dL   Troponin    Collection Time: 09/07/20  7:24 PM   Result Value Ref Range    Troponin <0.010 0.000 - 0.010 ng/mL   Brain Natriuretic Peptide    Collection Time: 09/07/20  7:24 PM   Result Value Ref Range    Pro- pg/mL   Lipase    Collection Time: 09/07/20  7:24 PM   Result Value Ref Range    Lipase 5 (L) 12 - 95 U/L   Magnesium    Collection Time: 09/07/20  7:24 PM   Result Value Ref Range    Magnesium 2.1 1.7 - 2.4 mg/dL   Ammonia    Collection Time: 09/07/20  7:30 PM   Result Value Ref Range    Ammonia 28 16 - 60 umol/L   Protime-INR    Collection Time: 09/07/20  7:44 PM   Result Value Ref Range    Protime 14.9 12.3 - 14.9 sec    INR 1.2    APTT    Collection Time: 09/07/20  7:44 PM   Result Value Ref Range    aPTT 32.2 24.4 - 36.8 sec   Lactate, Sepsis    Collection Time: 09/07/20  7:56 PM   Result Value Ref Range    Lactic Acid, Sepsis 2.6 (H) 0.5 - 1.9 mmol/L   Hepatic Function Panel    Collection Time: 09/07/20  7:56 PM   Result Value Ref Range    Total Protein 7.3 6.3 - 8.0 g/dL    Alb 3.9 3.5 - 4.6 g/dL    Alkaline Phosphatase 129 (H) 35 - 104 U/L    ALT 24 0 - 41 U/L    AST 24 0 - 40 U/L    Total Bilirubin 0.9 (H) 0.2 - 0.7 mg/dL    Bilirubin, Direct <0.2 0.0 - 0.4 mg/dL    Bilirubin, Indirect see below 0.0 - 0.6 mg/dL   Lactic Acid, Plasma    Collection Time: 09/07/20  8:01 PM   Result Value Ref Range    Lactic Acid 2.6 (H) 0.5 - 2.2 mmol/L   Urinalysis, reflex to microscopic    Collection Time: 09/07/20 10:10 PM   Result Value Ref Range    Color, UA Yellow Straw/Yellow    Clarity, UA Clear Clear    Glucose, Ur Negative Negative mg/dL    Bilirubin Urine Small Negative    Ketones, Urine 15 (A) Negative mg/dL    Specific Gravity, UA 1.020 1.005 - 1.030    Blood, Urine Negative Negative    pH, UA 5.5 5.0 - 9.0    Protein, UA Trace Negative mg/dL lesion and in the right lobe posteriorly a 2.7 cm lesion is seen. There are multiple smaller lesions seen throughout the liver. These are consistent with metastatic disease. A stent is seen in the common bile duct terminating in the duodenum. . The pancreatic duct is dilated to approximately 9 mm. The pancreas is atrophic. There is air seen within the gallbladder. The proximal small bowel is dilated in the region of the first and second portions of the duodenum. A mass is seen inferior to the body of the pancreas in the region of the proximal small bowel. The mass measures approximately 3.3 x 6.2 cm. The bowel wall is thickened in the area and also luminal narrowing. 2 cm lymph node seen in the region of the natacha hepatis. There is mild enhancement of the biliary ducts. No focal masses identified in the adrenal glands. Bilaterally both kidneys show uptake and excretion of contrast with no evidence for hydronephrosis or renal calculi. A 5 mm hypodensity is seen in the inferior pole of the right kidney. Mild perinephric stranding is seen that is nonspecific. The appendix is normal. No aneurysm or dissection is present. A small amount of free fluid is seen in the pelvis. Within the pelvis the prostate is unremarkable. The contrast is seen layering in the bladder. The bladder is distended. Degenerative changes are seen in the spine. Severe intervertebral disc space narrowing is seen at L5-S1. There is also anterolisthesis of L5 on S1. Bilateral pars defect is noted    Impression: 1. Mass concerning for metastatic disease in the proximal small bowel which shows dilatation in the bulb and slightly more distally the bowel shows bowel wall thickening and luminal narrowing. This mass is thought to possibly be causing gastric outlet obstruction. The stomach is dilated. 2. Multiple metastatic lesions are seen in the liver. There is pneumobilia. 3. Air is seen in the gallbladder which is likely iatrogenic.  There is a stent again seen in the common bile duct terminating in duodenum with mild enhancement of the bile ducts which could reflect cholangitis. All CT scans at this facility use dose modulation, iterative reconstruction, and/or weight based dosing XR CHEST (2 VW) COMPARISON: No prior studies available for comparison. HISTORY: abdominal Pain TECHNIQUE: PA and lateral views FINDINGS:  The lungs contain no focal infiltrate, pleural effusion or consolidation. The lungs are hyperinflated. The cardiac silhouette is within normal limits. The osseous structures are grossly intact. January 21, 2020 IMPRESSION: No acute cardiopulmonary process seen. Ct Abdomen Pelvis W Iv Contrast Additional Contrast? None    Result Date: 9/8/2020  Comparison: March 24, 2020 History: Abdominal pain; stomach pain x1 day Technique: CT ABDOMEN PELVIS W IV CONTRAST, Helically Acquired CT of the  abdomen and pelvis was performed during the intravenous infusion of 100 mL of Isovue-370. Axial delayed images were also performed. Reformatted sagittal and coronal images were also obtained. Findings: The visualized bases of the lungs contain no acute infiltrate, pleural effusion or consolidation. The stomach is very distended. Small hiatal hernia is seen. Pneumobilia is seen in the liver. Also multiple hypodense lesions are seen throughout the liver. 2 of the larger lesions seen in the liver or in the dome measuring 2.5 cm and 3.3 cm anteriorly in the right lobe. There are other larger lesions seen in the liver more inferiorly. In the left lobe is a 2.8 cm lesion and in the right lobe posteriorly a 2.7 cm lesion is seen. There are multiple smaller lesions seen throughout the liver. These are consistent with metastatic disease. A stent is seen in the common bile duct terminating in the duodenum. . The pancreatic duct is dilated to approximately 9 mm. The pancreas is atrophic. There is air seen within the gallbladder.  The proximal small bowel is dilated in the region of the first and second portions of the duodenum. A mass is seen inferior to the body of the pancreas in the region of the proximal small bowel. The mass measures approximately 3.3 x 6.2 cm. The bowel wall is thickened in the area and also luminal narrowing. 2 cm lymph node seen in the region of the natacha hepatis. There is mild enhancement of the biliary ducts. No focal masses identified in the adrenal glands. Bilaterally both kidneys show uptake and excretion of contrast with no evidence for hydronephrosis or renal calculi. A 5 mm hypodensity is seen in the inferior pole of the right kidney. Mild perinephric stranding is seen that is nonspecific. The appendix is normal. No aneurysm or dissection is present. A small amount of free fluid is seen in the pelvis. Within the pelvis the prostate is unremarkable. The contrast is seen layering in the bladder. The bladder is distended. Degenerative changes are seen in the spine. Severe intervertebral disc space narrowing is seen at L5-S1. There is also anterolisthesis of L5 on S1. Bilateral pars defect is noted    Impression: 1. Mass concerning for metastatic disease in the proximal small bowel which shows dilatation in the bulb and slightly more distally the bowel shows bowel wall thickening and luminal narrowing. This mass is thought to possibly be causing gastric outlet obstruction. The stomach is dilated. 2. Multiple metastatic lesions are seen in the liver. There is pneumobilia. 3. Air is seen in the gallbladder which is likely iatrogenic. There is a stent again seen in the common bile duct terminating in duodenum with mild enhancement of the bile ducts which could reflect cholangitis. All CT scans at this facility use dose modulation, iterative reconstruction, and/or weight based dosing XR CHEST (2 VW) COMPARISON: No prior studies available for comparison.  HISTORY: abdominal Pain TECHNIQUE: PA and lateral views FINDINGS:  The lungs contain no focal infiltrate, pleural effusion or consolidation. The lungs are hyperinflated. The cardiac silhouette is within normal limits. The osseous structures are grossly intact. January 21, 2020 IMPRESSION: No acute cardiopulmonary process seen. Us Abdomen Limited    Result Date: 9/8/2020  EXAMINATION:  ULTRASOUND ABDOMEN LIMITED CLINICAL HISTORY:  Upper abdominal pain, diminished appetite, nausea. Receiving radiation and chemotherapy for pancreatic cancer. History of prostate and urinary bladder cancer. History of colon cancer. COMPARISONS:  CT abdomen 9/7/2020 TECHNIQUE:  Transabdominal sector gray-scale sonography. Sonographic imaging was performed by a registered sonographer and the images were submitted for interpretation. FINDINGS:  Submitted scans demonstrate hepatic parenchymal heterogenous echotexture. The numerous circumscribed hepatic metastatic lesions that are visible on yesterday's CT scan are not very conspicuous on the submitted ultrasound images. The patient has a known endobiliary stent with biliary dilatation and pneumobilia, also not very conspicuous on the submitted ultrasound images. There is acoustic shadowing emanating from the nondependent portion of the gallbladder, concordant with gas within the gallbladder as demonstrated on yesterday's CT scan. The CT scan also showed a subcentimeter gallstone or polyp, concordant with today's ultrasound findings. Gas within the gallbladder limits sonographic evaluation of the gallbladder. The pancreas is not well visualized sonographically, largely due to to overlying obscuring bowel. There is no right hydronephrosis. No free fluid is seen in the upper abdomen. There is echogenic fluid distending the stomach and duodenum, concordant with CT findings. IMAGING FINDINGS ARE MORE CONSPICUOUS ON YESTERDAY'S CT SCAN THAN ON TODAY'S SUBMITTED ULTRASOUND IMAGES.  THE ULTRASOUND FINDINGS ARE CONCORDANT WITH HEPATIC METASTASES, PNEUMOBILIA, AND AT LEAST ONE SUBCENTIMETER STONE OR POLYP WITHIN THE GALLBLADDER. ASSESSMENT AND PLAN:  Progressive metastatic pancreas cancer despite chemotherapy and radiation. Now with failure to thrive, anorexia, and pain from carcinoma. Prognosis is poor. Life expectancy weeks with or without treatment. Hospice is appropriate. I will call his wife and I discussed with patient today.     Electronically signed by Chandrakant Pop DO on 9/8/2020 at 3:49 PM

## 2020-09-08 NOTE — CARE COORDINATION
[facility] EMERGENCY [facility] [place] Case Management Initial Discharge Assessment    Met with patient and spoke to wife on phone to discuss discharge plan. PCP: Saurav Medeiros MD                                Date of Last Visit: \"About a month ago\"    If no PCP, list provided? N/A    Discharge Planning    Living Arrangements: independently at home    Who do you live with? Spouse    Who helps you with your care:  spouse    If lives at home:     Do you have any barriers navigating in your home? no    Patient can perform ADL? Yes    Current Services (outpatient and in home) :  None    Dialysis: No    Is transportation available to get to your appointments? Yes    DME Equipment:  yes - Cane    Respiratory equipment: None    Respiratory provider:  no     Pharmacy:  yes - Drug [address] [place] [address] with Medication Assistance Program?  No      Patient agreeable to [facility]? Yes, Company TBD if needed    Patient agreeable to SNF/Rehab? No    Other discharge needs identified? Palliative Care    Freedom of choice list provided with basic dialogue that supports the patient's individualized plan of care/goals and shares the quality data associated with the providers. Yes    Does Patient Have a High-Risk for Readmission Diagnosis (CHF, PN, MI, COPD)? No    The plan for Transition of Care is related to the following treatment goals:None    Initial Discharge Plan? (Note: please see concurrent daily documentation for any updates after initial note). Pt reports that he resides with his wife and has 4 children. Due to prognosis, pt plans to return to home asap. He deferred to his wife for any other information. Spoke to spouse of pt. Discussed possibility of Palliative Care. Wife would like to speak to physician prior to making any decisions.      The Patient and/or patient representative: patient and wife was provided with choice of any post-acute providers for care and equipment and agrees with discharge plan  Yes    Electronically signed by ANN Deutsch on 9/8/2020 at 2:02 PM

## 2020-09-08 NOTE — H&P
(Oral)   Resp 18   Ht 6' 1\" (1.854 m)   Wt 154 lb 5.2 oz (70 kg)   SpO2 99%   BMI 20.36 kg/m²     Physical Exam  Vitals signs and nursing note reviewed. Constitutional:       General: He is not in acute distress. Appearance: He is well-developed. HENT:      Right Ear: External ear normal.      Left Ear: External ear normal.      Nose: Nose normal.      Mouth/Throat:      Mouth: Mucous membranes are moist.   Eyes:      Pupils: Pupils are equal, round, and reactive to light. Neck:      Musculoskeletal: Normal range of motion. Cardiovascular:      Rate and Rhythm: Normal rate and regular rhythm. Heart sounds: Normal heart sounds. Pulmonary:      Effort: Pulmonary effort is normal. No respiratory distress. Breath sounds: Normal breath sounds. No wheezing. Abdominal:      General: Bowel sounds are normal.      Palpations: Abdomen is soft. There is no mass. Tenderness: There is no abdominal tenderness. There is no right CVA tenderness, left CVA tenderness, guarding or rebound. Musculoskeletal: Normal range of motion. Right lower leg: No edema. Left lower leg: No edema. Lymphadenopathy:      Cervical: No cervical adenopathy. Skin:     General: Skin is warm and dry. Capillary Refill: Capillary refill takes less than 2 seconds. Neurological:      Mental Status: He is alert and oriented to person, place, and time. Deep Tendon Reflexes: Reflexes normal.   Psychiatric:         Mood and Affect: Mood normal.         Thought Content: Thought content normal.           DATA:     Diagnostic tests reviewed for today's visit:    Most recent labs and imaging results reviewed.      LABS:    Recent Results (from the past 24 hour(s))   CBC Auto Differential    Collection Time: 09/07/20  7:24 PM   Result Value Ref Range    WBC 19.1 (H) 4.8 - 10.8 K/uL    RBC 3.86 (L) 4.70 - 6.10 M/uL    Hemoglobin 12.8 (L) 14.0 - 18.0 g/dL    Hematocrit 38.6 (L) 42.0 - 52.0 %    MCV 99.9 80.0 - 100.0 fL    MCH 33.2 (H) 27.0 - 31.3 pg    MCHC 33.2 33.0 - 37.0 %    RDW 16.0 (H) 11.5 - 14.5 %    Platelets 770 835 - 931 K/uL    Neutrophils % 87.5 %    Lymphocytes % 4.6 %    Monocytes % 7.6 %    Eosinophils % 0.2 %    Basophils % 0.1 %    Neutrophils Absolute 16.7 (H) 1.4 - 6.5 K/uL    Lymphocytes Absolute 0.9 (L) 1.0 - 4.8 K/uL    Monocytes Absolute 1.5 (H) 0.2 - 0.8 K/uL    Eosinophils Absolute 0.0 0.0 - 0.7 K/uL    Basophils Absolute 0.0 0.0 - 0.2 K/uL   Comprehensive Metabolic Panel w/ Reflex to MG    Collection Time: 09/07/20  7:24 PM   Result Value Ref Range    Sodium 140 135 - 144 mEq/L    Potassium reflex Magnesium 3.5 3.4 - 4.9 mEq/L    Chloride 96 95 - 107 mEq/L    CO2 27 20 - 31 mEq/L    Anion Gap 17 (H) 9 - 15 mEq/L    Glucose 114 (H) 70 - 99 mg/dL    BUN 17 8 - 23 mg/dL    CREATININE 0.83 0.70 - 1.20 mg/dL    GFR Non-African American >60.0 >60    GFR  >60.0 >60    Calcium 10.0 (H) 8.5 - 9.9 mg/dL    Total Protein 7.3 6.3 - 8.0 g/dL    Alb 3.9 3.5 - 4.6 g/dL    Total Bilirubin 0.9 (H) 0.2 - 0.7 mg/dL    Alkaline Phosphatase 129 (H) 35 - 104 U/L    ALT 25 0 - 41 U/L    AST 25 0 - 40 U/L    Globulin 3.4 2.3 - 3.5 g/dL   Troponin    Collection Time: 09/07/20  7:24 PM   Result Value Ref Range    Troponin <0.010 0.000 - 0.010 ng/mL   Brain Natriuretic Peptide    Collection Time: 09/07/20  7:24 PM   Result Value Ref Range    Pro- pg/mL   Lipase    Collection Time: 09/07/20  7:24 PM   Result Value Ref Range    Lipase 5 (L) 12 - 95 U/L   Magnesium    Collection Time: 09/07/20  7:24 PM   Result Value Ref Range    Magnesium 2.1 1.7 - 2.4 mg/dL   Ammonia    Collection Time: 09/07/20  7:30 PM   Result Value Ref Range    Ammonia 28 16 - 60 umol/L   Protime-INR    Collection Time: 09/07/20  7:44 PM   Result Value Ref Range    Protime 14.9 12.3 - 14.9 sec    INR 1.2    APTT    Collection Time: 09/07/20  7:44 PM   Result Value Ref Range    aPTT 32.2 24.4 - 36.8 sec   Lactate, Sepsis Collection Time: 09/07/20  7:56 PM   Result Value Ref Range    Lactic Acid, Sepsis 2.6 (H) 0.5 - 1.9 mmol/L   Hepatic Function Panel    Collection Time: 09/07/20  7:56 PM   Result Value Ref Range    Total Protein 7.3 6.3 - 8.0 g/dL    Alb 3.9 3.5 - 4.6 g/dL    Alkaline Phosphatase 129 (H) 35 - 104 U/L    ALT 24 0 - 41 U/L    AST 24 0 - 40 U/L    Total Bilirubin 0.9 (H) 0.2 - 0.7 mg/dL    Bilirubin, Direct <0.2 0.0 - 0.4 mg/dL    Bilirubin, Indirect see below 0.0 - 0.6 mg/dL   Lactic Acid, Plasma    Collection Time: 09/07/20  8:01 PM   Result Value Ref Range    Lactic Acid 2.6 (H) 0.5 - 2.2 mmol/L   Urinalysis, reflex to microscopic    Collection Time: 09/07/20 10:10 PM   Result Value Ref Range    Color, UA Yellow Straw/Yellow    Clarity, UA Clear Clear    Glucose, Ur Negative Negative mg/dL    Bilirubin Urine Small Negative    Ketones, Urine 15 (A) Negative mg/dL    Specific Gravity, UA 1.020 1.005 - 1.030    Blood, Urine Negative Negative    pH, UA 5.5 5.0 - 9.0    Protein, UA Trace Negative mg/dL    Urobilinogen, Urine 0.2 <2.0 E.U./dL    Nitrite, Urine Negative Negative    Leukocyte Esterase, Urine Negative Negative       IMAGING:  No results found. VTE Prophylaxis: Lovenox    ASSESSMENT AND PLAN  Principal Problem:  1) Leukocytosis: WBCs at 19.1 with neutrophils at 18.7. Lymphocytes 0.9, monocytes 1.5. CT abdomen/pelvis completed in ED shows possible metastasis of liver cancer, cholangitis, and/or gas within the gallbladder lumen. Patient given Zosyn and normal saline bolus in ED. We will get ultrasound of abdomen. We will consult GI. May continue antibiotics based on GI recommendations. May consult oncology based on US findings. We will check CBC and CMP daily. 2) Pancreatic Cancer: Patient has completed radiation and chemo 1 to 2 weeks ago per patient. CT abdomen pelvis completed in ED shows possible metastasis of liver cancer, cholangitis, and/or gas within the gallbladder lumen.   We will consult GI. 3) Hypercalcemia: Mild elevation and calcium. Level 10.0. Patient has not felt like eating or drinking for 2 to 3 days. We will provide IV fluid hydration, reestablish p.o. hydration, and advance diet as tolerated. We will recheck lab levels daily. 4) Elevated lactic acid: Lactic acid 2.6. Patient given 1 L normal saline in ED. We will continue normal saline infusion and repeat lactic acid in the morning. I saw and evaluated the pt. I personally obtained the key and critical portions of the history and physical exam. I reviewed the mild level documentation and agree with assessment and plan that we come up together  Electronically signed by Emily Arriola MD on 9/8/20 at 7:06 PM EDT      Plan of care discussed with: patient    SIGNATURE: Donna Vargas RN, NP  DATE: September 8, 2020  TIME: 1:51 AM     CEFERINO Granados MD - supervising physician

## 2020-09-08 NOTE — PROGRESS NOTES
Assessment completed. Patient is alert and oriented X4. Denies any pain, nausea, or SOB at this time. Resting comfortably in bed. Denies any further needs. Will continue to monitor. Call light is within reach.   Electronically signed by Nicholas Snyder RN on 9/8/2020 at 7:58 PM

## 2020-09-08 NOTE — PROGRESS NOTES
Comprehensive Nutrition Assessment    Type and Reason for Visit:  Initial, Positive Nutrition Screen(nausea/vomiting, poor po intake)    Nutrition Recommendations/Plan: Continue General diet with clear liquid ONS TID    Nutrition Assessment:  Pt admitted with abdominal pain and nausea pta. Pt at high nutrition risk, meets criteria for severe malnutrition. Poor oral intake with wt loss due to chemo/radiation treatments for pancreatic cancer. Will start a nutrition supplement and continue to monitor    Malnutrition Assessment:  Malnutrition Status:  Severe malnutrition    Context:  Chronic Illness     Findings of the 6 clinical characteristics of malnutrition:  Energy Intake:  7 - 75% or less estimated energy requirements for 1 month or longer  Weight Loss:  7 - Greater than 10% over 6 months     Body Fat Loss:  1 - Mild body fat loss Orbital   Muscle Mass Loss:  1 - Mild muscle mass loss Temples (temporalis)  Fluid Accumulation:  Unable to assess     Strength:  Not Performed    Estimated Daily Nutrient Needs:  Energy (kcal):  7916-5772 (kg x 28-30); Weight Used for Energy Requirements:  Admission((70 kg))     Protein (g):  70-91 gm (kg x 1.0-1.3); Weight Used for Protein Requirements:  Admission(70 kg)        Fluid (ml/day):  ~2000 ml (1 ml/kcal); Weight Used for Fluid Requirements:  Admission      Nutrition Related Findings:  PMH: cancer (colon, bladder, pancreatic). S/P radiation chemotherapy.  no edema noted labs/meds reviewed, K+ 5.5, gluc 107-114, abdominal pain with nausea x 3 days pta, tolerating cold liquids      Wounds:  None       Current Nutrition Therapies:    DIET GENERAL;  Dietary Nutrition Supplements: Clear Liquid Oral Supplement    Anthropometric Measures:  · Height: 6' 1\" (185.4 cm)  · Current Body Weight: 151 lb (68.5 kg)(9/8-bedscale)   · Admission Body Weight: 154 lb (69.9 kg)(actual;bedscale)    · Usual Body Weight: 173 lb (78.5 kg)((3/25/20-bedscale))     · Ideal Body Weight: 184 lbs; % Ideal Body Weight  84%  · BMI: 19.9  · BMI Categories: Underweight (BMI less than 22) age over 72       Nutrition Diagnosis:   · In context of chronic illness, Severe malnutrition related to catabolic illness, inadequate protein-energy intake as evidenced by poor intake prior to admission, weight loss greater than or equal to 10% in 6 months, mild loss of subcutaneous fat, mild muscle loss      Nutrition Interventions:   Food and/or Nutrient Delivery:  Continue Current Diet, Start Oral Nutrition Supplement(Continue General diet with clear liquid ONS TID)  Nutrition Education/Counseling:  Education not indicated   Coordination of Nutrition Care:  Continued Inpatient Monitoring    Goals:  po intake > 50% of meals and supplements, wt gain > 154 lb       Nutrition Monitoring and Evaluation:   Food/Nutrient Intake Outcomes:  Food and Nutrient Intake, Supplement Intake  Physical Signs/Symptoms Outcomes:  Biochemical Data, Nausea or Vomiting, Weight     Electronically signed by Miles Ahn RD, LD on 9/8/20 at 2:14 PM EDT

## 2020-09-08 NOTE — PROGRESS NOTES
Pt admitted into room 489 transfer from [place]. Patient assessed and charted on by this RN. Vital signs are stable. Pt is A&Ox4. Fall precautions are in place. Call light in reach. Home medications have been reviewed. Will continue to monitor.

## 2020-09-09 VITALS
HEART RATE: 97 BPM | TEMPERATURE: 97.5 F | OXYGEN SATURATION: 99 % | BODY MASS INDEX: 20.45 KG/M2 | HEIGHT: 73 IN | RESPIRATION RATE: 17 BRPM | SYSTOLIC BLOOD PRESSURE: 161 MMHG | WEIGHT: 154.32 LBS | DIASTOLIC BLOOD PRESSURE: 96 MMHG

## 2020-09-09 LAB — CA 19-9: 7632 U/ML (ref 0–35)

## 2020-09-09 PROCEDURE — 6360000002 HC RX W HCPCS: Performed by: INTERNAL MEDICINE

## 2020-09-09 PROCEDURE — 2580000003 HC RX 258: Performed by: INTERNAL MEDICINE

## 2020-09-09 PROCEDURE — 6360000002 HC RX W HCPCS: Performed by: NURSE PRACTITIONER

## 2020-09-09 PROCEDURE — C9113 INJ PANTOPRAZOLE SODIUM, VIA: HCPCS | Performed by: INTERNAL MEDICINE

## 2020-09-09 RX ADMIN — ONDANSETRON 4 MG: 2 INJECTION INTRAMUSCULAR; INTRAVENOUS at 06:19

## 2020-09-09 RX ADMIN — MORPHINE SULFATE 2 MG: 2 INJECTION, SOLUTION INTRAMUSCULAR; INTRAVENOUS at 11:42

## 2020-09-09 RX ADMIN — Medication 10 ML: at 08:50

## 2020-09-09 RX ADMIN — MORPHINE SULFATE 2 MG: 2 INJECTION, SOLUTION INTRAMUSCULAR; INTRAVENOUS at 06:19

## 2020-09-09 RX ADMIN — ENOXAPARIN SODIUM 40 MG: 40 INJECTION SUBCUTANEOUS at 08:50

## 2020-09-09 RX ADMIN — SODIUM CHLORIDE: 9 INJECTION, SOLUTION INTRAVENOUS at 02:08

## 2020-09-09 RX ADMIN — MORPHINE SULFATE 2 MG: 2 INJECTION, SOLUTION INTRAMUSCULAR; INTRAVENOUS at 02:08

## 2020-09-09 RX ADMIN — PANTOPRAZOLE SODIUM 40 MG: 40 INJECTION, POWDER, FOR SOLUTION INTRAVENOUS at 08:50

## 2020-09-09 ASSESSMENT — PAIN SCALES - GENERAL
PAINLEVEL_OUTOF10: 7
PAINLEVEL_OUTOF10: 6
PAINLEVEL_OUTOF10: 6
PAINLEVEL_OUTOF10: 7
PAINLEVEL_OUTOF10: 6

## 2020-09-09 NOTE — DISCHARGE SUMMARY
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 *    No chief complaint on file. Condition: unchanged  Activity: no restrictions   Diet: regular  Disposition: home with hospice  Functional Status: ambulatory    Significant Findings:     CT Abd/Pelvis:  1.  Mass concerning for metastatic disease in the proximal small bowel which shows dilatation in the bulb and slightly more distally the bowel shows bowel wall thickening and luminal narrowing. This mass is thought to possibly be causing gastric outlet    obstruction. The stomach is dilated. 2. Multiple metastatic lesions are seen in the liver. Hospital Course:   31-year-old gentleman with history of pancreatic cancer s/p chemotherapy and radiation presented with 2-day history of worsening abdominal pain, nausea, and vomiting. He was unfortunately found to have new metastatic disease-a mass in his duodenum, as well as multiple lesions in the liver. After discussion with oncology regarding prognosis, decision was made to proceed with comfort care and home hospice. Exam on Discharge:   BP (!) 161/96   Pulse 97   Temp 97.5 °F (36.4 °C) (Oral)   Resp 17   Ht 6' 1\" (1.854 m)   Wt 154 lb 5.2 oz (70 kg)   SpO2 99%   BMI 20.36 kg/m²   General appearance: Ill-appearing. Thin.   Mental Status: oriented to person, place and time and normal affect  Lungs: clear to auscultation bilaterally, normal effort  Heart: regular rate and rhythm, no

## 2020-09-09 NOTE — PROGRESS NOTES
Patient had an emesis episode of 100mL, green in color. Phenergan was given. Also, he complained of pain 7/10; morphine was given. Will continue to monitor. Call light is within reach.   Electronically signed by Radha Guzman RN on 9/8/2020 at 10:48 PM

## 2020-09-09 NOTE — PROGRESS NOTES
3416:  Pt had very small green/brown emesis. States he cannot eat solid food but will continue with liquids as tolerated. Inquiring about going home, would like to go home today but is agreeable to stay for hospice meeting at 1000. Assessment as charted. IVF infusing without difficulty. Will cont to monitor. 1120:  Family at bedside, hospice meeting completed, pt will go [facility] with [facility], [facility]. Will medicate pt for pain prior to discharge. 1140:  Morphine given per MAR, peripheral IV removed. Discharge instructions discussed with pt and wife who verbalize understanding. Wife assisting pt with getting dressed. Will advise when ready for wheelchair transport. 1220:  Pt left floor via w/c in stable condition accompanied by PCA.

## 2020-09-10 ENCOUNTER — TELEPHONE (OUTPATIENT)
Dept: GASTROENTEROLOGY | Age: 85
End: 2020-09-10

## 2020-09-13 LAB
BLOOD CULTURE, ROUTINE: NORMAL
CULTURE, BLOOD 2: NORMAL

## 2023-12-11 NOTE — CONSULTS
Consult to Gastroenterology  Consult performed by: Pippa Flores MD  Consult ordered by: Gloria Valdovinos MD        Patient Name: Anisha Congress Date: 3/25/2020 10:45 AM  MR #: [id number]  : 1935    Attending Physician: Gloria Valdovinos MD  Reason for consult: Obstructive Jaundice     History of Presenting Illness:      Jenny Suggs is a 81 y.o. male on hospital day 0 with a history of prostate/bladder cancer, . History Obtained From:  patient  Patient presented to the ED with complaints of jaundice. He reports his stools have been gray and his urine tea colored for months. +urticaria, but thought this was due to dry skin. Patient noted a 25 pound weight loss on the past few months. Decreased appetite for \"months\". No abdominal pain, N/V. Patient reports he has a history of prostate and bladder cancer. No complaints of F/C. No history of alcohol abuse. Family histroy of colon cancer in his mother. His last colonoscopy was in 2019. Patient denies CP, SOB, or palpitations.    History:      Past Medical History:   Diagnosis Date    Cancer [facility])     prostate and bladder      Past Surgical History:   Procedure Laterality Date    COLONOSCOPY  2019    ccf    HERNIA REPAIR      x2      Family History  Family History   Problem Relation Age of Onset    Colon Cancer Mother      Social History     Socioeconomic History    Marital status:      Spouse name: Not on file    Number of children: Not on file    Years of education: Not on file    Highest education level: Not on file   Occupational History    Not on file   Social Needs    Financial resource strain: Not on file    Food insecurity     Worry: Not on file     Inability: Not on file    Transportation needs     Medical: Not on file     Non-medical: Not on file   Tobacco Use    Smoking status: Never Smoker    Smokeless tobacco: Never Used   Substance and Sexual Activity    Alcohol use: Not Currently    Drug use: Never    Sexual no PMH, NKDA. Has not had full BM in 3 days. x2 days of fever. x3 episodes of emesis and some episodes of diarrhea. Able to hydrate. Last got motrin at 3pm. Pt awake, alert, interacting appropriately. Pt coloring appropriate, brisk capillary refill noted, easy WOB noted.

## 2024-10-03 NOTE — TELEPHONE ENCOUNTER
Called and talked to [facility] OVID[facility] daughter, will review scans with radiology and assess if will benefit from stent to the duodenum.   Advised came to liaise with staff and physicians at hospice
Patients daughter Chari Benson called asking if you can call her [phone].
The daughter called to let you know that her father is now out of hospice now and she would like a call back.
Warm

## (undated) DEVICE — TOWEL,OR,DSP,ST,BLUE,STD,4/PK,20PK/CS: Brand: MEDLINE

## (undated) DEVICE — CONMED SCOPE SAVER BITE BLOCK, 20X27 MM: Brand: SCOPE SAVER

## (undated) DEVICE — TUBE SET 96 MM 64 MM H2O PERISTALTIC STD AUX CHANNEL

## (undated) DEVICE — Device: Brand: ENDO SMARTCAP

## (undated) DEVICE — SYSTEM BX CAP BILI RAP EXCHG CAP LOK DEV COMPATIBLE W/ OLY

## (undated) DEVICE — ADAPTER FLSH PMP FLD MGMT GI IRRIG OFP 2 DISPOSABLE

## (undated) DEVICE — ELECTRODE PT RET AD L9FT HI MOIST COND ADH HYDRGEL CORDED

## (undated) DEVICE — Z DISCONTINUED USE 2277125 SYRINGE NEEDLELESS 10 CC LUER LCK SLIP STRL LTX FREE

## (undated) DEVICE — SPHINCTEROTOME: Brand: DREAMTOME™ RX 44

## (undated) DEVICE — GLOVE ORANGE PI 8   MSG9080

## (undated) DEVICE — GOWN,AURORA,NONREINFORCED,LARGE: Brand: MEDLINE

## (undated) DEVICE — CONTAINER,SPECIMEN,OR STERILE,4OZ: Brand: MEDLINE

## (undated) DEVICE — SNARE HEX 2.4X13MM

## (undated) DEVICE — Device: Brand: BALLOON3

## (undated) DEVICE — THE DISPOSABLE RAPTOR GRASPING DEVICE IS USED TO GRASP TISSUE AND/OR RETRIEVE FOREIGN BODIES, EXCISED TISSUE AND STENTS DURING ENDOSCOPIC PROCEDURES.: Brand: RAPTOR

## (undated) DEVICE — RETRIEVAL BALLOON CATHETER: Brand: EXTRACTOR™ PRO RX-S

## (undated) DEVICE — SYRINGE MED 50ML LUERLOCK TIP

## (undated) DEVICE — 4-PORT MANIFOLD: Brand: NEPTUNE 2

## (undated) DEVICE — TUBING, SUCTION, 1/4" X 10', STRAIGHT: Brand: MEDLINE

## (undated) DEVICE — LABEL MED MINI W/ MARKER

## (undated) DEVICE — BRUSH ENDO CLN L90.5IN SHTH DIA1.7MM BRIST DIA5-7MM 2-6MM

## (undated) DEVICE — COVER,TABLE,44X90,STERILE: Brand: MEDLINE

## (undated) DEVICE — ENDOSCOPIC ULTRASOUND FINE NEEDLE BIOPSY (FNB) DEVICE: Brand: ACQUIRE

## (undated) DEVICE — ENDOSCOPIC TRAY TRNSPRT 20.5X16.5X4.1 IN RECYCL SUGAR PULP

## (undated) DEVICE — ENDO CARRY-ON PROCEDURE KIT: Brand: ENDO CARRY-ON PROCEDURE KIT

## (undated) DEVICE — JELLY,LUBE,STERILE,FLIP TOP,TUBE,2-OZ: Brand: MEDLINE